# Patient Record
Sex: FEMALE | Race: WHITE | Employment: OTHER | URBAN - NONMETROPOLITAN AREA
[De-identification: names, ages, dates, MRNs, and addresses within clinical notes are randomized per-mention and may not be internally consistent; named-entity substitution may affect disease eponyms.]

---

## 2017-02-20 ENCOUNTER — TELEPHONE (OUTPATIENT)
Dept: NEUROSURGERY | Age: 57
End: 2017-02-20

## 2017-02-27 ENCOUNTER — OFFICE VISIT (OUTPATIENT)
Dept: NEUROLOGY | Age: 57
End: 2017-02-27
Payer: COMMERCIAL

## 2017-02-27 VITALS
DIASTOLIC BLOOD PRESSURE: 74 MMHG | HEIGHT: 66 IN | WEIGHT: 187 LBS | SYSTOLIC BLOOD PRESSURE: 112 MMHG | BODY MASS INDEX: 30.05 KG/M2

## 2017-02-27 DIAGNOSIS — G43.719 INTRACTABLE CHRONIC MIGRAINE WITHOUT AURA AND WITHOUT STATUS MIGRAINOSUS: Primary | ICD-10-CM

## 2017-02-27 DIAGNOSIS — Z88.9 HISTORY OF ADVERSE DRUG REACTION: ICD-10-CM

## 2017-02-27 DIAGNOSIS — G25.0 ESSENTIAL TREMOR: ICD-10-CM

## 2017-02-27 PROCEDURE — 99214 OFFICE O/P EST MOD 30 MIN: CPT | Performed by: PSYCHIATRY & NEUROLOGY

## 2017-02-27 RX ORDER — PANTOPRAZOLE SODIUM 40 MG/1
40 TABLET, DELAYED RELEASE ORAL DAILY
COMMUNITY
Start: 2017-02-06

## 2017-02-27 RX ORDER — LEVOFLOXACIN 500 MG/1
TABLET, FILM COATED ORAL
COMMUNITY
Start: 2017-01-03 | End: 2017-02-27 | Stop reason: ALTCHOICE

## 2017-02-27 RX ORDER — RIZATRIPTAN BENZOATE 10 MG/1
TABLET ORAL
COMMUNITY
Start: 2016-11-21 | End: 2017-02-27 | Stop reason: SDUPTHER

## 2017-03-02 RX ORDER — RIZATRIPTAN BENZOATE 10 MG/1
10 TABLET ORAL
Qty: 18 TABLET | Refills: 2 | Status: SHIPPED | OUTPATIENT
Start: 2017-03-02 | End: 2017-05-17 | Stop reason: SDUPTHER

## 2017-05-17 ENCOUNTER — TELEPHONE (OUTPATIENT)
Dept: NEUROLOGY | Age: 57
End: 2017-05-17

## 2017-05-17 RX ORDER — RIZATRIPTAN BENZOATE 10 MG/1
10 TABLET ORAL
Qty: 18 TABLET | Refills: 2 | Status: SHIPPED | OUTPATIENT
Start: 2017-05-17 | End: 2019-05-28

## 2017-06-30 ENCOUNTER — TELEPHONE (OUTPATIENT)
Dept: NEUROLOGY | Age: 57
End: 2017-06-30

## 2019-01-28 ENCOUNTER — TELEPHONE (OUTPATIENT)
Dept: NEUROLOGY | Age: 59
End: 2019-01-28

## 2019-02-27 ENCOUNTER — OFFICE VISIT (OUTPATIENT)
Dept: NEUROSURGERY | Age: 59
End: 2019-02-27
Payer: COMMERCIAL

## 2019-02-27 VITALS
HEIGHT: 66 IN | BODY MASS INDEX: 28.61 KG/M2 | OXYGEN SATURATION: 100 % | DIASTOLIC BLOOD PRESSURE: 92 MMHG | HEART RATE: 70 BPM | WEIGHT: 178 LBS | SYSTOLIC BLOOD PRESSURE: 133 MMHG

## 2019-02-27 DIAGNOSIS — R51.9 NONINTRACTABLE HEADACHE, UNSPECIFIED CHRONICITY PATTERN, UNSPECIFIED HEADACHE TYPE: Primary | ICD-10-CM

## 2019-02-27 PROCEDURE — 99204 OFFICE O/P NEW MOD 45 MIN: CPT | Performed by: PSYCHIATRY & NEUROLOGY

## 2019-02-27 RX ORDER — DIAZEPAM 5 MG/1
5 TABLET ORAL NIGHTLY
COMMUNITY
Start: 2019-02-26

## 2019-02-27 RX ORDER — PREDNISONE 1 MG/1
5 TABLET ORAL DAILY
COMMUNITY
Start: 2019-02-26 | End: 2019-05-28 | Stop reason: ALTCHOICE

## 2019-03-06 ENCOUNTER — TELEPHONE (OUTPATIENT)
Dept: NEUROLOGY | Age: 59
End: 2019-03-06

## 2019-03-07 ENCOUNTER — TELEPHONE (OUTPATIENT)
Dept: NEUROLOGY | Age: 59
End: 2019-03-07

## 2019-03-12 ENCOUNTER — TELEPHONE (OUTPATIENT)
Dept: NEUROLOGY | Age: 59
End: 2019-03-12

## 2019-05-28 ENCOUNTER — OFFICE VISIT (OUTPATIENT)
Dept: NEUROSURGERY | Age: 59
End: 2019-05-28
Payer: COMMERCIAL

## 2019-05-28 VITALS
OXYGEN SATURATION: 98 % | HEIGHT: 66 IN | DIASTOLIC BLOOD PRESSURE: 64 MMHG | HEART RATE: 64 BPM | SYSTOLIC BLOOD PRESSURE: 108 MMHG | BODY MASS INDEX: 27.48 KG/M2 | WEIGHT: 171 LBS

## 2019-05-28 DIAGNOSIS — R51.9 NONINTRACTABLE HEADACHE, UNSPECIFIED CHRONICITY PATTERN, UNSPECIFIED HEADACHE TYPE: Primary | ICD-10-CM

## 2019-05-28 PROCEDURE — 99214 OFFICE O/P EST MOD 30 MIN: CPT | Performed by: PSYCHIATRY & NEUROLOGY

## 2019-05-28 NOTE — PROGRESS NOTES
[facility] Neurology Office Note      Patient:   Luanne Dykes  MR#:    [id number]  Account Number:                         YOB: 1960  Date of Evaluation:  5/28/2019  Time of Note:                          12:02 PM  Primary/Referring Physician:  Gato Cho MD  Consulting Physician:  Adrain Severance, DO    FOLLOW UP VISIT    Chief Complaint   Patient presents with    Migraine     3 Month follow up headaches are better     Results     MRI result       HISTORY OF PRESENT ILLNESS    Luanne Dykes is a 66y.o. year old female here for headaches. Doing better on Emgality. MRI with nonspecific white matter changes and atrophy. Present for quite sometime. Pain is typically posterior, will last hours to days. Some photophobia. She does note some visual changes with the headaches, though no consistent aura noted. Some nausea noted. Relieved with rest.  Though headaches occur typically at night. Still \rResponds well to Maxalt. Current headache frequency is much improved. Similar duration and character. She has tried Topamax and did not help.       Past Medical History:   Diagnosis Date    Depression     seasonal    GERD (gastroesophageal reflux disease)     Kidney stone        Past Surgical History:   Procedure Laterality Date    KIDNEY STONE SURGERY      TONSILLECTOMY         Family History   Problem Relation Age of Onset    Depression Mother     Alzheimer's Disease Mother        Social History     Socioeconomic History    Marital status: Single     Spouse name: Not on file    Number of children: Not on file    Years of education: Not on file    Highest education level: Not on file   Occupational History    Not on file   Social Needs    Financial resource strain: Not on file    Food insecurity:     Worry: Not on file     Inability: Not on file    Transportation needs:     Medical: Not on file     Non-medical: Not on file   Tobacco Use    Smoking status: Never Smoker    Smokeless tobacco: Never Used   Substance and Sexual Activity    Alcohol use: No    Drug use: Never    Sexual activity: Not Currently   Lifestyle    Physical activity:     Days per week: Not on file     Minutes per session: Not on file    Stress: Not on file   Relationships    Social connections:     Talks on phone: Not on file     Gets together: Not on file     Attends Uatsdin service: Not on file     Active member of club or organization: Not on file     Attends meetings of clubs or organizations: Not on file     Relationship status: Not on file    Intimate partner violence:     Fear of current or ex partner: Not on file     Emotionally abused: Not on file     Physically abused: Not on file     Forced sexual activity: Not on file   Other Topics Concern    Not on file   Social History Narrative    Not on file       Current Outpatient Medications   Medication Sig Dispense Refill    diazepam (VALIUM) 5 MG tablet Take 5 mg by mouth nightly. Low Bruner Galcanezumab-gnlm (EMGALITY) 120 MG/ML SOAJ Inject 120 mg into the skin every 30 days 1 pen 11    rizatriptan (MAXALT) 10 MG tablet Take 1 tablet by mouth once as needed for Migraine May repeat in 2 hours if needed 18 tablet 2    pantoprazole (PROTONIX) 40 MG tablet Take 40 mg by mouth daily        No current facility-administered medications for this visit.         Allergies   Allergen Reactions    Pcn [Penicillins] Rash     rash    Sulfa Antibiotics Rash     Rash           REVIEW OF SYSTEMS    Constitutional: []Fever []Sweat []Chills [] Recent Injury [x] Denies all unless marked  HEENT:[]Headache  [] Head Injury/Hearing Loss  [] Sore Throat  [] Ear Ache/Dizziness  [x] Denies all unless marked  Spine:  [] Neck pain  [] Back pain  [] Sciaticia  [x] Denies all unless marked  Cardiovascular:[]Heart Disease []Chest Pain [] Palpitations  [x] Denies all unless marked  Pulmonary: []Shortness of Breath []Cough   [x] Denies all unless marke  Gastrointestinal: []Nausea []Vomiting  []Abdominal Pain  []Constipation  []Diarrhea  []Dark Bloody Stools  [x] Denies all unless marked  Psychiatric/Behavioral:[] Depression [] Anxiety [x] Denies all unless marked  Genitourinary:   [] Frequency  [] Urgency  [] Incontinence [] Pain with Urination  [x] Denies all unless marked  Extremities: []Pain  []Swelling  [x] Denies all unless marked  Musculoskeletal: [] Muscle Pain  [] Joint Pain  [] Arthritis [] Muscle Cramps [] Muscle Twitches  [x] Denies all unless marked  Sleep: [] Insomnia [] Snoring [] Restless Legs [] Sleep Apnea  [] Daytime Sleepiness  [x] Denies all unless marked  Skin:[] Rash [] Skin Discoloration [x] Denies all unless marked   Neurological: []Visual Disturbance/Memory Loss [] Loss of Balance [] Slurred Speech/Weakness [] Seizures  [] Vertigo/Dizziness [x] Denies all unless marked     I have reviewed the above ROS with the patient and agree with the ROS as documented above. PHYSICAL EXAM    Constitutional -   /64   Pulse 64   Ht 5' 6\" (1.676 m)   Wt 171 lb (77.6 kg)   SpO2 98%   BMI 27.60 kg/m²   General appearance: No acute distress   EYES -   Conjunctiva normal  Pupillary exam as below, see CN exam in the neurologic exam  ENT-    No scars, masses, or lesions over external nose or ears  Hearing normal bilaterally to finger rub  Cardiovascular -   RRR  No clubbing, cyanosis, or edema   Pulmonary-   Good expansion, normal effort without use of accessory muscles  CTA  Musculoskeletal -   No significant wasting of muscles noted  Gait as below, see gait exam in the neurologic exam  Muscle strength, tone, stability as below. No bony deformities  Skin -   Warm, dry, and intact to inspection and palpation.     No rash, erythema, or pallor  Psychiatric -   Mood, affect, and behavior appear normal    Memory as below see mental status examination in the neurologic exam    NEUROLOGICAL EXAM    Mental status   [x]Awake, alert, oriented   [x]Affect attention and concentration appear appropriate  [x]Recent and remote memory appears unremarkable  [x]Speech normal without dysarthria or aphasia, comprehension and repetition intact. COMMENTS:    Cranial Nerves [x]No VF deficit to confrontation,  no papilledema on fundoscopic exam.  [x]PERRLA, EOMI, no nystagmus, conjugate eye movements, no ptosis  [x]Face symmetric  [x]Facial sensation intact  [x]Tongue midline no atrophy or fasciculations present  [x]Palate midline, hearing to finger rub normal bilaterally  [x]Shoulder shrug and SCM testing normal bilaterally  COMMENTS:   Motor   [x]5/5 strength x 4 extremities  [x]Normal bulk and tone  []No tremor present  [x]No rigidity or bradykinesia noted  COMMENTS: Right hand tremor, mild, postural   Sensory  [x]Sensation intact to light touch, pin prick, vibration, and proprioception BLE  []Sensation intact to light touch, pin prick, vibration, and proprioception BUE  COMMENTS:   Coordination [x]FTN normal bilaterally   []HTS normal bilaterally  []WILI normal bilaterally. COMMENTS:   Reflexes  [x]Symmetric and non-pathological  [x]Toes down going bilaterally  [x]No clonus present  COMMENTS:   Gait                  [x]Normal steady gait    []Ataxic    []Spastic     []Magnetic     []Shuffling  COMMENTS:       LABS RECORD AND IMAGING REVIEW (As below and per HPI)    Records reviewed. ASSESSMENT:    Pernell Moseley is a 66y.o. year old female here for headaches. Suspect migraine given history. MRI with nonspecific white matter changes and atrophy. Mild tremor noted as well, unchanged. PLAN:  1. Continue Emgality for prevention. Side effects discussed. 2.  Continue maxalt prn  3. Follow tremor, question very subtle parkinsonism, follow clinically. 4.  Non-specific MRI changes, MS felt unlikely, NPH also felt unlikely, will follow clinically, repeat imaging or further workup if needed.      Pavel Morales DO  Board Certified Neurology

## 2021-07-12 ENCOUNTER — NURSE TRIAGE (OUTPATIENT)
Dept: OTHER | Facility: CLINIC | Age: 61
End: 2021-07-12

## 2021-07-12 ENCOUNTER — HOSPITAL ENCOUNTER (EMERGENCY)
Age: 61
Discharge: HOME | End: 2021-07-12
Attending: EMERGENCY MEDICINE
Payer: COMMERCIAL

## 2021-07-12 ENCOUNTER — APPOINTMENT (OUTPATIENT)
Dept: MRI IMAGING | Age: 61
End: 2021-07-12
Payer: COMMERCIAL

## 2021-07-12 ENCOUNTER — APPOINTMENT (OUTPATIENT)
Dept: CT IMAGING | Age: 61
End: 2021-07-12
Payer: COMMERCIAL

## 2021-07-12 ENCOUNTER — APPOINTMENT (OUTPATIENT)
Dept: GENERAL RADIOLOGY | Age: 61
End: 2021-07-12
Payer: COMMERCIAL

## 2021-07-12 VITALS
TEMPERATURE: 97.8 F | HEART RATE: 98 BPM | OXYGEN SATURATION: 99 % | DIASTOLIC BLOOD PRESSURE: 69 MMHG | WEIGHT: 185 LBS | RESPIRATION RATE: 18 BRPM | BODY MASS INDEX: 29.86 KG/M2 | SYSTOLIC BLOOD PRESSURE: 135 MMHG

## 2021-07-12 DIAGNOSIS — H53.2 DIPLOPIA: Primary | ICD-10-CM

## 2021-07-12 DIAGNOSIS — R42 DIZZINESS: ICD-10-CM

## 2021-07-12 LAB
ALBUMIN SERPL-MCNC: 4.8 G/DL (ref 3.5–5.2)
ALP BLD-CCNC: 89 U/L (ref 35–104)
ALT SERPL-CCNC: 19 U/L (ref 5–33)
ANION GAP SERPL CALCULATED.3IONS-SCNC: 11 MMOL/L (ref 7–19)
APTT: 27 SEC (ref 26–36.2)
AST SERPL-CCNC: 20 U/L (ref 5–32)
BASOPHILS ABSOLUTE: 0 K/UL (ref 0–0.2)
BASOPHILS RELATIVE PERCENT: 0.5 % (ref 0–1)
BILIRUB SERPL-MCNC: <0.2 MG/DL (ref 0.2–1.2)
BILIRUBIN URINE: NEGATIVE
BLOOD, URINE: NEGATIVE
BUN BLDV-MCNC: 13 MG/DL (ref 6–20)
C-REACTIVE PROTEIN: 0.4 MG/DL (ref 0–0.5)
CALCIUM SERPL-MCNC: 9.9 MG/DL (ref 8.6–10)
CHLORIDE BLD-SCNC: 101 MMOL/L (ref 98–111)
CLARITY: CLEAR
CO2: 26 MMOL/L (ref 22–29)
COLOR: YELLOW
CREAT SERPL-MCNC: 0.8 MG/DL (ref 0.5–0.9)
EOSINOPHILS ABSOLUTE: 0.1 K/UL (ref 0–0.6)
EOSINOPHILS RELATIVE PERCENT: 2.6 % (ref 0–5)
GFR AFRICAN AMERICAN: >59
GFR NON-AFRICAN AMERICAN: >60
GLUCOSE BLD-MCNC: 105 MG/DL (ref 74–109)
GLUCOSE URINE: NEGATIVE MG/DL
HCT VFR BLD CALC: 43.4 % (ref 37–47)
HEMOGLOBIN: 14.3 G/DL (ref 12–16)
IMMATURE GRANULOCYTES #: 0 K/UL
INR BLD: 0.93 (ref 0.88–1.18)
KETONES, URINE: NEGATIVE MG/DL
LEUKOCYTE ESTERASE, URINE: NEGATIVE
LYMPHOCYTES ABSOLUTE: 1.8 K/UL (ref 1.1–4.5)
LYMPHOCYTES RELATIVE PERCENT: 33.6 % (ref 20–40)
MCH RBC QN AUTO: 28.5 PG (ref 27–31)
MCHC RBC AUTO-ENTMCNC: 32.9 G/DL (ref 33–37)
MCV RBC AUTO: 86.6 FL (ref 81–99)
MONOCYTES ABSOLUTE: 0.5 K/UL (ref 0–0.9)
MONOCYTES RELATIVE PERCENT: 9 % (ref 0–10)
NEUTROPHILS ABSOLUTE: 3 K/UL (ref 1.5–7.5)
NEUTROPHILS RELATIVE PERCENT: 54.1 % (ref 50–65)
NITRITE, URINE: NEGATIVE
PDW BLD-RTO: 13.6 % (ref 11.5–14.5)
PH UA: 5.5 (ref 5–8)
PLATELET # BLD: 308 K/UL (ref 130–400)
PMV BLD AUTO: 9.1 FL (ref 9.4–12.3)
POTASSIUM REFLEX MAGNESIUM: 4.1 MMOL/L (ref 3.5–5)
PRO-BNP: 29 PG/ML (ref 0–900)
PROTEIN UA: NEGATIVE MG/DL
PROTHROMBIN TIME: 12.7 SEC (ref 12–14.6)
RBC # BLD: 5.01 M/UL (ref 4.2–5.4)
SEDIMENTATION RATE, ERYTHROCYTE: 1 MM/HR (ref 0–25)
SODIUM BLD-SCNC: 138 MMOL/L (ref 136–145)
SPECIFIC GRAVITY UA: 1.01 (ref 1–1.03)
TOTAL PROTEIN: 7.6 G/DL (ref 6.6–8.7)
TROPONIN: <0.01 NG/ML (ref 0–0.03)
UROBILINOGEN, URINE: 0.2 E.U./DL
WBC # BLD: 5.5 K/UL (ref 4.8–10.8)

## 2021-07-12 PROCEDURE — 85610 PROTHROMBIN TIME: CPT

## 2021-07-12 PROCEDURE — 83880 ASSAY OF NATRIURETIC PEPTIDE: CPT

## 2021-07-12 PROCEDURE — 71046 X-RAY EXAM CHEST 2 VIEWS: CPT

## 2021-07-12 PROCEDURE — 99283 EMERGENCY DEPT VISIT LOW MDM: CPT

## 2021-07-12 PROCEDURE — 85652 RBC SED RATE AUTOMATED: CPT

## 2021-07-12 PROCEDURE — 70496 CT ANGIOGRAPHY HEAD: CPT

## 2021-07-12 PROCEDURE — 85025 COMPLETE CBC W/AUTO DIFF WBC: CPT

## 2021-07-12 PROCEDURE — 36415 COLL VENOUS BLD VENIPUNCTURE: CPT

## 2021-07-12 PROCEDURE — 70450 CT HEAD/BRAIN W/O DYE: CPT

## 2021-07-12 PROCEDURE — 6360000004 HC RX CONTRAST MEDICATION: Performed by: EMERGENCY MEDICINE

## 2021-07-12 PROCEDURE — 85730 THROMBOPLASTIN TIME PARTIAL: CPT

## 2021-07-12 PROCEDURE — 81003 URINALYSIS AUTO W/O SCOPE: CPT

## 2021-07-12 PROCEDURE — 86140 C-REACTIVE PROTEIN: CPT

## 2021-07-12 PROCEDURE — 93005 ELECTROCARDIOGRAM TRACING: CPT | Performed by: EMERGENCY MEDICINE

## 2021-07-12 PROCEDURE — 70498 CT ANGIOGRAPHY NECK: CPT

## 2021-07-12 PROCEDURE — 70551 MRI BRAIN STEM W/O DYE: CPT

## 2021-07-12 PROCEDURE — 84484 ASSAY OF TROPONIN QUANT: CPT

## 2021-07-12 PROCEDURE — 80053 COMPREHEN METABOLIC PANEL: CPT

## 2021-07-12 RX ORDER — CHOLECALCIFEROL (VITAMIN D3) 1250 MCG
CAPSULE ORAL
COMMUNITY

## 2021-07-12 RX ADMIN — IOPAMIDOL 90 ML: 755 INJECTION, SOLUTION INTRAVENOUS at 14:27

## 2021-07-12 ASSESSMENT — ENCOUNTER SYMPTOMS
SHORTNESS OF BREATH: 0
VOMITING: 0
COUGH: 0
DIARRHEA: 0
EYE PAIN: 0
VOICE CHANGE: 0
RHINORRHEA: 0
ABDOMINAL PAIN: 0
EYE REDNESS: 0

## 2021-07-12 ASSESSMENT — PAIN DESCRIPTION - PAIN TYPE: TYPE: ACUTE PAIN

## 2021-07-12 ASSESSMENT — PAIN DESCRIPTION - LOCATION: LOCATION: HEAD

## 2021-07-12 ASSESSMENT — PAIN DESCRIPTION - DESCRIPTORS: DESCRIPTORS: ACHING

## 2021-07-12 ASSESSMENT — PAIN SCALES - GENERAL: PAINLEVEL_OUTOF10: 2

## 2021-07-12 NOTE — ED TRIAGE NOTES
Pt here with blurred and double vision for the past 3 weeks pt states it came on all of a sudden. Pt has been seen by PCP and eye doctor with no answers.   PCP has adjusted bp meds and eye doctor has seen no issues

## 2021-07-12 NOTE — ED PROVIDER NOTES
[facility] EMERGENCY DEPT  EMERGENCY DEPARTMENT ENCOUNTER      Pt Name: Vinny Castillo  MRN: [id number]  [place] 1960  Date of evaluation: 7/12/2021  Provider: Sonu Santo MD    CHIEF COMPLAINT       Chief Complaint   Patient presents with   [facility]     for 3 weeks blurred and double    Headache         HISTORY OF PRESENT ILLNESS   (Location/Symptom, Timing/Onset,Context/Setting, Quality, Duration, Modifying Factors, Severity)  Note limiting factors. Vinny Castillo is a 64 y.o. female who presents to the emergency department for evaluation of blurred and double vision that has been present over the last 3 weeks. States this started suddenly and has been associated with a left-sided headache as well. States vision in her right eye seem to be affected much more than her left. Has constant blurred vision which she describes as images being stacked on top of 1 another. Does not seem to be with any certain gaze, but is more constant. States she has seen an ophthalmologist twice and had work-up done that showed no primary ocular etiology. Has seen her primary care doctor who tried 2 different antihypertensives which made her feel worse. Try to get into neurology but was advised to come here for evaluation. Denies any weakness, numbness, or tingling. Does feel dizzy and lightheaded which is constant. No specific spinning or vertiginous sensation reported. Dizziness is worsened by eye movements or ambulation. Does have some balance difficulty as well. Patient has no significant chronic medical problems such as diabetes, high blood pressure, heart problems. No history of stroke. The history is provided by the patient and a relative. NursingNotes were reviewed. REVIEW OF SYSTEMS    (2-9 systems for level 4, 10 or more for level 5)     Review of Systems   Constitutional: Positive for fatigue. Negative for fever. HENT: Negative for congestion, rhinorrhea and voice change.     Eyes: Smoking status: Never Smoker    Smokeless tobacco: Never Used   Vaping Use    Vaping Use: Never used   Substance and Sexual Activity    Alcohol use: No    Drug use: Never    Sexual activity: Not Currently   Other Topics Concern    None   Social History Narrative    None     Social Determinants of Health     Financial Resource Strain:     Difficulty of Paying Living Expenses:    Food Insecurity:     Worried About Running Out of Food in the Last Year:     Ran Out of Food in the Last Year:    Transportation Needs:     Lack of Transportation (Medical):  Lack of Transportation (Non-Medical):    Physical Activity:     Days of Exercise per Week:     Minutes of Exercise per Session:    Stress:     Feeling of Stress :    Social Connections:     Frequency of Communication with Friends and Family:     Frequency of Social Gatherings with Friends and Family:     Attends Sabianism Services:     Active Member of Clubs or Organizations:     Attends Club or Organization Meetings:     Marital Status:    Intimate Partner Violence:     Fear of Current or Ex-Partner:     Emotionally Abused:     Physically Abused:     Sexually Abused:        SCREENINGS             PHYSICAL EXAM    (up to 7 for level 4, 8 or more for level 5)     ED Triage Vitals [07/12/21 1155]   BP Temp Temp src Pulse Resp SpO2 Height Weight   (!) 156/90 97.8 °F (36.6 °C) -- 97 18 95 % -- 185 lb (83.9 kg)       Physical Exam  Vitals and nursing note reviewed. Constitutional:       General: She is not in acute distress. Appearance: She is well-developed. She is not toxic-appearing or diaphoretic. HENT:      Head: Normocephalic and atraumatic. Eyes:      General: No scleral icterus. Right eye: No discharge. Left eye: No discharge. Pupils: Pupils are equal, round, and reactive to light. Cardiovascular:      Rate and Rhythm: Normal rate and regular rhythm. Pulses: Normal pulses.       Heart sounds: Normal heart sounds. Pulmonary:      Effort: Pulmonary effort is normal. No respiratory distress. Breath sounds: Normal breath sounds. No stridor. No wheezing or rhonchi. Abdominal:      General: There is no distension. Musculoskeletal:         General: No deformity. Normal range of motion. Cervical back: Normal range of motion. Right lower leg: No edema. Left lower leg: No edema. Skin:     General: Skin is warm and dry. Neurological:      Mental Status: She is alert and oriented to person, place, and time. GCS: GCS eye subscore is 4. GCS verbal subscore is 5. GCS motor subscore is 6. Cranial Nerves: Cranial nerves are intact. No cranial nerve deficit. Sensory: Sensation is intact. Motor: Motor function is intact. No weakness or abnormal muscle tone. Coordination: Finger-Nose-Finger Test abnormal.      Comments: Nystagmus with leftward gaze. Slightly abnormal coordination/cerebellar testing in the left upper extremity. Psychiatric:         Behavior: Behavior normal.         Thought Content: Thought content normal.         Judgment: Judgment normal.         DIAGNOSTIC RESULTS     EKG: All EKG's are interpreted by the Emergency Department Physician who either signs or Co-signs this chart in the absence of a cardiologist.        RADIOLOGY:   Non-plain film images such as CT, Ultrasound and MRI are read by the radiologist. Plainradiographic images are visualized and preliminarily interpreted by the emergency physician with the below findings:      Interpretation per the Radiologist below, if available at the time of this note:    MRI BRAIN WO CONTRAST   Final Result   1. No acute intracranial findings. 2. Minimal nonspecific FLAIR signal hyperintensities in the white   matter, most often seen as sequela of chronic microvascular ischemia. Signed by Dr Amando Rebollar   Final Result   1. No flow-limiting stenosis or arterial occlusion in the neck.  In particular, there is no flow-limiting stenosis at the ICA origins   according to the NASCET calculation, with essentially no atheromatous   plaque identified at the ICA origins. 2. No intracranial flow-limiting stenosis or large vessel occlusion. Signed by Dr Ghassan Mcdonald   Final Result   1. No flow-limiting stenosis or arterial occlusion in the neck. In   particular, there is no flow-limiting stenosis at the ICA origins   according to the NASCET calculation, with essentially no atheromatous   plaque identified at the ICA origins. 2. No intracranial flow-limiting stenosis or large vessel occlusion. Signed by Dr Taylor Mohan Contrast   Final Result   1. No acute intracranial process. Signed by Dr Heather Nam      XR CHEST (2 VW)   Final Result   1. No radiographic evidence of acute cardiopulmonary process. Signed by Dr Babs Blanco            ED BEDSIDE ULTRASOUND:   Performed by ED Physician - none    LABS:  Labs Reviewed   CBC WITH AUTO DIFFERENTIAL - Abnormal; Notable for the following components:       Result Value    MCHC 32.9 (*)     MPV 9.1 (*)     All other components within normal limits   COMPREHENSIVE METABOLIC PANEL W/ REFLEX TO MG FOR LOW K   APTT   PROTIME-INR   TROPONIN   BRAIN NATRIURETIC PEPTIDE   URINE RT REFLEX TO CULTURE   SEDIMENTATION RATE   C-REACTIVE PROTEIN       All other labs were within normal range or not returned as of this dictation.     Medications   iopamidol (ISOVUE-370) 76 % injection 90 mL (90 mLs Intravenous Given 7/12/21 1427)       EMERGENCY DEPARTMENT COURSE and DIFFERENTIALDIAGNOSIS/MDM:   Vitals:    Vitals:    07/12/21 1300 07/12/21 1332 07/12/21 1400 07/12/21 1500   BP: (!) 149/95 137/71 131/77 135/69   Pulse: 96   98   Resp: 18   18   Temp:       SpO2: 95% 100% 99% 99%   Weight:           MDM  Number of Diagnoses or Management Options  Diplopia: new and requires workup  Dizziness: new and requires workup     Amount and/or Complexity of Data Reviewed  Clinical lab tests: ordered and reviewed  Tests in the radiology section of CPT®: ordered and reviewed  Tests in the medicine section of CPT®: reviewed and ordered  Obtain history from someone other than the patient: yes  Review and summarize past medical records: yes  Discuss the patient with other providers: yes  Independent visualization of images, tracings, or specimens: yes    Risk of Complications, Morbidity, and/or Mortality  Presenting problems: high  Diagnostic procedures: moderate  Management options: moderate    Patient Progress  Patient progress: stable      ED Course as of Jul 12 2119   Mon Jul 12, 2021   1728 Sed Rate: 1 [EDDIE]   1800 On neurologic exam, evaluation is normal other than some mild leftward nystagmus and slight questionable left upper extremity difficulty with finger-nose-finger. CT and CTA of the head and neck are unremarkable. Laboratory evaluation also unremarkable. Sed rate is well within normal limits. Discussed case with neurology on-call who recommends brain MRI to rule out posterior circulation abnormality. MRI negative for any acute findings such as mass, infarct, hemorrhage, or other acute findings to explain symptoms. Does show some chronic appearing white matter changes likely consistent with small vessel ischemic disease. [EDDIE]      ED Course User Index  [EDDIE] Milena Choudhury MD     Evaluation and work-up here revealed no signs of emergent or life-threatening pathology that would necessitate admission for further work-up or management at this time. Patient is felt to be stable for discharge home with return precautions for worsening of the condition or development of new concerning symptoms. Patient was encouraged to follow-up with their primary care doctor in the appropriate timeframe. Necessary prescriptions and information have been provided for treatment at home.   Patient voices understanding and agreement with the plan.      CONSULTS:  None    PROCEDURES:  Unless otherwise notedbelow, none     Procedures      FINAL IMPRESSION     1. Diplopia    2.  Dizziness          DISPOSITION/PLAN   DISPOSITION Decision To Discharge 07/12/2021 06:09:21 PM      PATIENT REFERRED TO:  [facility] EMERGENCY DEPT  [facility]  [phone]    If symptoms worsen    Janice Medley MD  [address] [facility] [phone]    Go to   at [address] [place] [address] tomorrow (7/13)      DISCHARGE MEDICATIONS:  Discharge Medication List as of 7/12/2021  6:52 PM             (Please note that portions of this note were completed with a voice recognition program.  Efforts were made to edit the dictations butoccasionally words are mis-transcribed.)    Sonu Santo MD (electronically signed)  AttendingEmerNorthwest Health Physicians' Specialty Hospitalcy Physician         Sonu Berger MD  07/12/21 7079

## 2021-07-12 NOTE — TELEPHONE ENCOUNTER
Received call from Afsaneh Galvan at [facility] with Red Flag Complaint. Brief description of triage: patient is c/o blurred vision is right eye, dizziness, HA, brain fog and she has been seen her eye doctor and her PCP for this. She is calling to now schedule an appointment with Neurology. Symptoms are worsening since being seen by PCP. Triage indicates for patient to go to ED now    Care advice provided, patient verbalizes understanding; denies any other questions or concerns; instructed to call back for any new or worsening symptoms. Attention Provider: Thank you for allowing me to participate in the care of your patient. The patient was connected to triage in response to information provided to the [facility]. Please do not respond through this encounter as the response is not directed to a shared pool. Reason for Disposition   Patient sounds very sick or weak to the triager    Answer Assessment - Initial Assessment Questions  1. DESCRIPTION: Tello Lucas is the vision loss like? Describe it for me. \" (e.g., complete vision loss, blurred vision, double vision, floaters, etc.)      Blurred vision    2. LOCATION: \"One or both eyes? \" If one, ask: \"Which eye? \"      Both eyes    3. SEVERITY: \"Can you see anything? \" If so, ask: \"What can you see? \" (e.g., fine print)      Just blurred    4. ONSET: \"When did this begin? \" \"Did it start suddenly or has this been gradual?\"      3 weeks ago, sudden    5. PATTERN: \"Does this come and go, or has it been constant since it started? \"      Constant    6. PAIN: \"Is there any pain in your eye(s)? \"  (Scale 1-10; or mild, moderate, severe)      Denies    7. CONTACTS-GLASSES: \"Do you wear contacts or glasses? \"      \"cheaters\"    8. CAUSE: \"What do you think is causing this visual problem? \"      Unsure    9. OTHER SYMPTOMS: \"Do you have any other symptoms? \" (e.g., confusion, headache, arm or leg weakness, speech problems)      Dizzy, fatigue, HA, brain fog    10.  PREGNANCY: \"Is there any chance you are pregnant? \" \"When was your last menstrual period? \"        NA    Protocols used: VISION LOSS OR CHANGE-ADULT-OH

## 2021-07-13 ENCOUNTER — OFFICE VISIT (OUTPATIENT)
Dept: NEUROLOGY | Age: 61
End: 2021-07-13
Payer: COMMERCIAL

## 2021-07-13 VITALS
DIASTOLIC BLOOD PRESSURE: 85 MMHG | HEIGHT: 66 IN | WEIGHT: 185 LBS | HEART RATE: 65 BPM | BODY MASS INDEX: 29.73 KG/M2 | SYSTOLIC BLOOD PRESSURE: 127 MMHG

## 2021-07-13 DIAGNOSIS — H53.2 DOUBLE VISION: ICD-10-CM

## 2021-07-13 DIAGNOSIS — H53.9 VISUAL CHANGES: ICD-10-CM

## 2021-07-13 DIAGNOSIS — R53.83 OTHER FATIGUE: ICD-10-CM

## 2021-07-13 DIAGNOSIS — H53.9 VISUAL CHANGES: Primary | ICD-10-CM

## 2021-07-13 LAB
EKG P AXIS: 27 DEGREES
EKG P-R INTERVAL: 188 MS
EKG Q-T INTERVAL: 408 MS
EKG QRS DURATION: 90 MS
EKG QTC CALCULATION (BAZETT): 411 MS
EKG T AXIS: 6 DEGREES
VITAMIN B-12: 641 PG/ML (ref 211–946)

## 2021-07-13 PROCEDURE — 93010 ELECTROCARDIOGRAM REPORT: CPT | Performed by: INTERNAL MEDICINE

## 2021-07-13 PROCEDURE — 1111F DSCHRG MED/CURRENT MED MERGE: CPT | Performed by: PSYCHIATRY & NEUROLOGY

## 2021-07-13 PROCEDURE — 99204 OFFICE O/P NEW MOD 45 MIN: CPT | Performed by: PSYCHIATRY & NEUROLOGY

## 2021-07-13 RX ORDER — DOXYCYCLINE HYCLATE 100 MG
100 TABLET ORAL 2 TIMES DAILY
Qty: 14 TABLET | Refills: 0 | Status: SHIPPED | OUTPATIENT
Start: 2021-07-13 | End: 2021-07-20

## 2021-07-13 NOTE — PROGRESS NOTES
Review of Systems    Constitutional - No fever or chills. No diaphoresis or significant fatigue. HENT -  No tinnitus or significant hearing loss. Eyes - yes sudden vision change or eye pain  Respiratory - no significant shortness of breath or cough  Cardiovascular - no chest pain No palpitations or significant leg swelling  Gastrointestinal - no abdominal swelling or pain. Genitourinary - No difficulty urinating, dysuria  Musculoskeletal - no back pain or myalgia. Skin - no color change or rash  Neurologic - No seizures. No lateralizing weakness. Hematologic - no easy bruising or excessive bleeding. Psychiatric - no severe anxiety or nervousness. All other review of systems are negative.

## 2021-07-13 NOTE — PROGRESS NOTES
Chief Complaint   Patient presents with    Follow-Up from Hospital     maxine Farley is a 64y.o. year old female who is seen for evaluation of blurred vision in both eyes, double vision in the right eye, and some difficulty thinking. The patient indicates that about 2 weeks ago she was cutting some limbs. The next morning she noted some bumps over the right eyelid. The morning after that her right eyelid was swelling till it was shut. This resolved but she had double vision in the right eye with bilateral blurred vision. She has some difficulty thinking. The example she provided was that she dressed before drying her hair which is not normal for her. She indicates her eye exam was unremarkable. She went to the ER and an MRI of the brain, CTA of the head and neck were unremarkable. She had a sed rate of 1.     Active Ambulatory Problems     Diagnosis Date Noted    No Active Ambulatory Problems     Resolved Ambulatory Problems     Diagnosis Date Noted    No Resolved Ambulatory Problems     Past Medical History:   Diagnosis Date    Depression     GERD (gastroesophageal reflux disease)     Kidney stone        Past Surgical History:   Procedure Laterality Date    KIDNEY STONE SURGERY      TONSILLECTOMY         Family History   Problem Relation Age of Onset    Depression Mother     Alzheimer's Disease Mother        Allergies   Allergen Reactions    Pcn [Penicillins] Rash     rash    Sulfa Antibiotics Rash     Rash         Social History     Socioeconomic History    Marital status: Single     Spouse name: Not on file    Number of children: Not on file    Years of education: Not on file    Highest education level: Not on file   Occupational History    Not on file   Tobacco Use    Smoking status: Never Smoker    Smokeless tobacco: Never Used   Vaping Use    Vaping Use: Never used   Substance and Sexual Activity    Alcohol use: No    Drug use: Never    Sexual activity: Not diazepam (VALIUM) 5 MG tablet Take 5 mg by mouth nightly. Osmar Florence pantoprazole (PROTONIX) 40 MG tablet Take 40 mg by mouth daily       rizatriptan (MAXALT) 10 MG tablet Take 1 tablet by mouth once as needed for Migraine May repeat in 2 hours if needed 18 tablet 2     No current facility-administered medications for this visit. /85   Pulse 65   Ht 5' 6\" (1.676 m)   Wt 185 lb (83.9 kg)   BMI 29.86 kg/m²     Constitutional - well developed, well nourished. Eyes - conjunctiva normal.  Pupils not tested  Ear, nose, throat -hearing intact to finger rub No scars, masses, or lesions over external nose or ears, no atrophy of tongue  Neck-symmetric, no masses noted, no jugular vein distension  Respiration- chest wall appears symmetric, good expansion,   normal effort without use of accessory muscles  Musculoskeletal - no significant wasting of muscles noted, no bony deformities  Extremities-no clubbing, cyanosis or edema  Skin - warm, dry, and intact. No rash, erythema, or pallor.   Psychiatric - mood, affect, and behavior appear normal.      Neurological exam  Awake, alert, fluent oriented x 3 appropriate affect  Attention and concentration appear appropriate  Recent and remote memory appears unremarkable  Speech normal without dysarthria  No clear issues with language of fund of knowledge    Cranial Nerve Exam   CN II- Visual fields grossly unremarkable  CN III, IV,VI-EOMI, No nystagmus, conjugate eye movements, no ptosis  CN V-sensation intact to LT over face  CN VII-no facial assymetry  CN VIII-Hearing intact to finger rub  CN IX and X- Palate not tested  CN XI-not test shoulder shrug  CN XII-Tongue midline with no fasciculations or fibrillations    Motor Exam  V/V throughout upper and lower extremities bilaterally, no cogwheeling, normal tone    Sensory Exam  Sensation intact to light touch and temperature upper and lower extremities bilaterally    Reflexes   2+ biceps bilaterally  2+ brachioradialis  2+patella  2+ ankle jerks  No clonus ankles  No Selby's sign bilateral hands    Tremors- no tremors in hands or head noted    Gait  Normal base and speed  No ataxia    Coordination  Finger to nose-unremarkable    Lab Results   Component Value Date    [id number] 641 07/13/2021     Lab Results   Component Value Date    WBC 5.5 07/12/2021    HGB 14.3 07/12/2021    HCT 43.4 07/12/2021    MCV 86.6 07/12/2021     07/12/2021     Lab Results   Component Value Date     07/12/2021    K 4.1 07/12/2021     07/12/2021    CO2 26 07/12/2021    BUN 13 07/12/2021    CREATININE 0.8 07/12/2021    GLUCOSE 105 07/12/2021    CALCIUM 9.9 07/12/2021    PROT 7.6 07/12/2021    LABALBU 4.8 07/12/2021    BILITOT <0.2 07/12/2021    ALKPHOS 89 07/12/2021    AST 20 07/12/2021    ALT 19 07/12/2021    LABGLOM >60 07/12/2021    GFRAA >59 07/12/2021           Assessment    ICD-10-CM    1. Visual changes  H53.9 Heavy Metal Blood Panel     Lupus (LE) panel w/ reflex     LYME (B.BURGDORFERI) PCR     Lyme Disease Acute Reflexive Panel     Vitamin B12     Rickettsia Rickettsii ([place] Spotted Fever -RMSF) Antibodies IgG & IgM by IFA     Ehrlichia Antibody Panel     AK DISCHARGE MEDS RECONCILED W/ CURRENT OUTPATIENT MED LIST     CANCELED: B. Burgdorferi Antibodies by WB     CANCELED: YADI 2 - Anti SSA & Anti SSB   2. Double vision  H53.2 Heavy Metal Blood Panel     Lupus (LE) panel w/ reflex     LYME (B.BURGDORFERI) PCR     Lyme Disease Acute Reflexive Panel     Vitamin B12     Rickettsia Rickettsii ([place] Spotted Fever -RMSF) Antibodies IgG & IgM by IFA     Ehrlichia Antibody Panel     AK DISCHARGE MEDS RECONCILED W/ CURRENT OUTPATIENT MED LIST     CANCELED: B. Burgdorferi Antibodies by WB     CANCELED: YADI 2 - Anti SSA & Anti SSB   3.  Other fatigue  R53.83 Heavy Metal Blood Panel     Lupus (LE) panel w/ reflex     LYME (B.BURGDORFERI) PCR     Lyme Disease Acute Reflexive Panel     Vitamin B12 Rickettsia Rickettsii COFF[facility] Spotted Fever -RMSF) Antibodies IgG & IgM by IFA     Ehrlichia Antibody Panel     MT DISCHARGE MEDS RECONCILED W/ CURRENT OUTPATIENT MED LIST     CANCELED: B. Burgdorferi Antibodies by WB     CANCELED: YADI 2 - Anti SSA & Anti SSB       Her neurological examination today was unremarkable. Based upon her history and examination I have a low suspicion for a primary neurological etiology. The swelling of the right eyelid could certainly be seen in an infectious etiology such as a tick bit or inflammatory/allergic reaction is possible. At this time she will be scheduled for labs as below. She will be given a course of Doxycycline for possible tick borne illnesses. The patient indicated understanding of the management plan. She is to call following testing to determine what further management is warranted. Plan    Orders Placed This Encounter   Procedures    Heavy Metal Blood Panel    Lupus (LE) panel w/ reflex    LYME (B.BURGDORFERI) PCR    Lyme Disease Acute Reflexive Panel    Vitamin B12    Rickettsia Rickettsii ([place] Spotted Fever -RMSF) Antibodies IgG & IgM by IFA    Ehrlichia Antibody Panel    MT DISCHARGE MEDS RECONCILED W/ CURRENT OUTPATIENT MED LIST       Orders Placed This Encounter   Medications    doxycycline hyclate (VIBRA-TABS) 100 MG tablet     Sig: Take 1 tablet by mouth 2 times daily for 7 days     Dispense:  14 tablet     Refill:  0       Return if symptoms worsen or fail to improve. EMR Dragon/transcription disclaimer:Significant part of this  encounter note is electronic transcription/translation of spoken language to printed text. The electronic translation of spoken language may be erroneous, or at times, nonsensical words or phrases may be inadvertently transcribed.  Although I have reviewed the note for such errors, some may still exist.

## 2021-07-14 LAB — LYME, EIA: 0.49 LIV (ref 0–1.2)

## 2021-07-15 LAB
ANA IGG, ELISA: NORMAL
ARSENIC BLOOD: <10 UG/L
C3 COMPLEMENT: 166 MG/DL (ref 88–201)
C4 COMPLEMENT: 30 MG/DL (ref 10–40)
CADMIUM: <1 UG/L
EHRLICHIA CHAFFEENSIS AB, IGG: NORMAL
EHRLICHIA CHAFFEENSIS AB, IGM: NORMAL
LEAD LEVEL BLOOD: <2 UG/DL
MERCURY BLOOD: <2.5 UG/L
RHEUMATOID FACTOR: <10 IU/ML (ref 0–14)
ROCKY MOUNTAIN SPOTTED FEVER AB IGM: NORMAL
ROCKY MOUNTAIN SPOTTED FEVER ANTIBODY IGG: NORMAL

## 2021-08-17 ENCOUNTER — PROCEDURE VISIT (OUTPATIENT)
Dept: ENT CLINIC | Age: 61
End: 2021-08-17
Payer: COMMERCIAL

## 2021-08-17 ENCOUNTER — OFFICE VISIT (OUTPATIENT)
Dept: ENT CLINIC | Age: 61
End: 2021-08-17
Payer: COMMERCIAL

## 2021-08-17 VITALS
HEIGHT: 66 IN | WEIGHT: 195 LBS | BODY MASS INDEX: 31.34 KG/M2 | DIASTOLIC BLOOD PRESSURE: 64 MMHG | SYSTOLIC BLOOD PRESSURE: 110 MMHG

## 2021-08-17 DIAGNOSIS — R42 DIZZINESS: Primary | ICD-10-CM

## 2021-08-17 DIAGNOSIS — R42 DIZZINESS OF UNKNOWN ETIOLOGY: Primary | ICD-10-CM

## 2021-08-17 PROCEDURE — 99202 OFFICE O/P NEW SF 15 MIN: CPT | Performed by: PHYSICIAN ASSISTANT

## 2021-08-17 PROCEDURE — 92567 TYMPANOMETRY: CPT | Performed by: AUDIOLOGIST

## 2021-08-17 PROCEDURE — 92557 COMPREHENSIVE HEARING TEST: CPT | Performed by: AUDIOLOGIST

## 2021-08-17 RX ORDER — MECLIZINE HYDROCHLORIDE 25 MG/1
TABLET ORAL
COMMUNITY
Start: 2021-07-27

## 2021-08-17 ASSESSMENT — ENCOUNTER SYMPTOMS
VOICE CHANGE: 0
RHINORRHEA: 0
SINUS PAIN: 0
SORE THROAT: 0
FACIAL SWELLING: 0
EYE PAIN: 0
TROUBLE SWALLOWING: 0
SINUS PRESSURE: 0
EYE DISCHARGE: 0

## 2021-08-17 NOTE — PROGRESS NOTES
History   Isabella Genao is a 64 y.o. female who presented to the clinic this date with complaints of imbalance, lightheadedness, fatigue, double vision and brain fog. She reported symptoms have been ongoing for about 2 months. She denied changes in hearing. She reported occasional tinnitus but noted that has been long standing and she does not feel it is related to dizziness. She denied aural fullness. She denied dizziness with positional changes and laying down/turning in bed. Summary   Tympanometry consistent with normal TM mobility bilaterally. Pure tone testing indicated mild SNHL at 2 and 4 kHz bilaterally. Word recognition scores were excellent bilaterally. Results   Otoscopy:    Right: Clear EAC/Normal TM   Left: Clear EAC/Normal TM    Audiometry:    Right: Mild SNHL at 2 and 4 kHz   Left: Mild SNHL at 2 and 4 kHz         Tympanometry:     Right: Type A   Left: Type A    Plan   Results of today's testing were discussed with Ms. Warren Hampton and the following recommendations were made:    1. Follow up with ENT as scheduled.         Audiogram and Acoustic Immittance

## 2021-08-17 NOTE — PROGRESS NOTES
[facility] OTOLARYNGOLOGY/ENT  Ms. Jacky Robin is a pleasant 62-year-old  female that was followed by Dr. Joselin Cortez and by Dr. Luis Simms due to problems with imbalance as well as blurred vision. She reports the onset began about a month ago and was noted after she was cleaning up limbs in her yard. She underwent MRI of the head that was unremarkable as well as a close evaluation by Dr. Luis Simms that was unremarkable. Now she is referred for possible vertigo. Overall she reports that she is improving with no therapy at this time and believes this may be a viral etiology. Overall this does not correlate to Covid. Allergies: Piper, Pcn [penicillins], and Sulfa antibiotics      Current Outpatient Medications   Medication Sig Dispense Refill    meclizine (ANTIVERT) 25 MG tablet       diazepam (VALIUM) 5 MG tablet Take 5 mg by mouth nightly. Elder Rued pantoprazole (PROTONIX) 40 MG tablet Take 40 mg by mouth daily       Cholecalciferol (VITAMIN D3) 1.25 MG (89576 UT) CAPS Take by mouth (Patient not taking: Reported on 8/17/2021)      rizatriptan (MAXALT) 10 MG tablet Take 1 tablet by mouth once as needed for Migraine May repeat in 2 hours if needed 18 tablet 2     No current facility-administered medications for this visit. Past Surgical History:   Procedure Laterality Date    KIDNEY STONE SURGERY      TONSILLECTOMY         Past Medical History:   Diagnosis Date    Depression     seasonal    Dizziness     GERD (gastroesophageal reflux disease)     Kidney stone        Family History   Problem Relation Age of Onset    Depression Mother     Alzheimer's Disease Mother        Social History     Tobacco Use    Smoking status: Never Smoker    Smokeless tobacco: Never Used   Substance Use Topics    Alcohol use: No           REVIEW OF SYSTEMS:  all other systems reviewed and are negative  Review of Systems   Constitutional: Negative for chills and fever.    HENT: Negative for congestion, dental problem, ear discharge, ear pain, facial swelling, hearing loss, nosebleeds, postnasal drip, rhinorrhea, sinus pressure, sinus pain, sneezing, sore throat, tinnitus, trouble swallowing and voice change. Eyes: Positive for visual disturbance (Bilateral blurry vision sporadically with a normal ophthalmologic exam.). Negative for pain and discharge. Neurological: Positive for dizziness (balance by hx. ) and headaches. Negative for seizures, syncope and speech difficulty. Comments:     PHYSICAL EXAM:    /64   Ht 5' 6\" (1.676 m)   Wt 195 lb (88.5 kg)   BMI 31.47 kg/m²   Body mass index is 31.47 kg/m². General Appearance: well developed  and well nourished  Head/ Face: normocephalic and atraumatic  Vocal Quality: good/ normal  Ears: Right Ear: External: external ears normal Otoscopy Ear Canal: canal clear Otoscopy TM: TM's normal and TM's mobile Left Ear: External: external ears normal Otoscopy Ear Canal: canal clear Otoscopy TM: TM's normal and TM's mobile  Hearing: grossly intact  Nose: nares normal and septum midline  Neck: supple and adenopathy none palpable  Thyroid: normal and nodules No    Assessment & Plan:    Problem List Items Addressed This Visit     Dizziness of unknown etiology - Primary      Dizziness from unknown etiology possible viral syndrome with improvement  Plan: Overall this does not sound to be suspicious for BPPV or Ménière's disease. I recommend the patient continue her meclizine as needed and give it time for this to resolve. If she has recurrence with her symptoms, would recommend a possible evaluation at a tertiary care center. She is to follow-up with me as needed. She is reminded to call if she has any questions or problems. No orders of the defined types were placed in this encounter. No orders of the defined types were placed in this encounter.       Electronically signed by Mauro Albright PA-C on 8/17/21 at 8:51 PM CDT          Please note that this chart was generated using dragon dictation software. Although every effort was made to ensure the accuracy of this automated transcription, some errors in transcription may have occurred.

## 2021-08-18 NOTE — ASSESSMENT & PLAN NOTE
Dizziness from unknown etiology possible viral syndrome with improvement  Plan: Overall this does not sound to be suspicious for BPPV or Ménière's disease. I recommend the patient continue her meclizine as needed and give it time for this to resolve. If she has recurrence with her symptoms, would recommend a possible evaluation at a tertiary care center. She is to follow-up with me as needed. She is reminded to call if she has any questions or problems.

## 2022-05-19 ENCOUNTER — TRANSCRIBE ORDERS (OUTPATIENT)
Dept: LAB | Facility: HOSPITAL | Age: 62
End: 2022-05-19

## 2022-05-19 DIAGNOSIS — Z11.59 SCREENING FOR VIRAL DISEASE: Primary | ICD-10-CM

## 2022-05-20 ENCOUNTER — LAB (OUTPATIENT)
Dept: LAB | Facility: HOSPITAL | Age: 62
End: 2022-05-20

## 2022-05-20 ENCOUNTER — PRE-ADMISSION TESTING (OUTPATIENT)
Dept: PREADMISSION TESTING | Facility: HOSPITAL | Age: 62
End: 2022-05-20

## 2022-05-20 VITALS
SYSTOLIC BLOOD PRESSURE: 131 MMHG | BODY MASS INDEX: 31.6 KG/M2 | OXYGEN SATURATION: 100 % | DIASTOLIC BLOOD PRESSURE: 83 MMHG | HEART RATE: 63 BPM | WEIGHT: 196.65 LBS | RESPIRATION RATE: 18 BRPM | HEIGHT: 66 IN

## 2022-05-20 LAB
DEPRECATED RDW RBC AUTO: 47.4 FL (ref 37–54)
ERYTHROCYTE [DISTWIDTH] IN BLOOD BY AUTOMATED COUNT: 14.6 % (ref 12.3–15.4)
HCT VFR BLD AUTO: 44.8 % (ref 34–46.6)
HGB BLD-MCNC: 13.8 G/DL (ref 12–15.9)
MCH RBC QN AUTO: 27.6 PG (ref 26.6–33)
MCHC RBC AUTO-ENTMCNC: 30.8 G/DL (ref 31.5–35.7)
MCV RBC AUTO: 89.6 FL (ref 79–97)
PLATELET # BLD AUTO: 334 10*3/MM3 (ref 140–450)
PMV BLD AUTO: 9.5 FL (ref 6–12)
RBC # BLD AUTO: 5 10*6/MM3 (ref 3.77–5.28)
SARS-COV-2 ORF1AB RESP QL NAA+PROBE: NOT DETECTED
WBC NRBC COR # BLD: 4.42 10*3/MM3 (ref 3.4–10.8)

## 2022-05-20 PROCEDURE — 85027 COMPLETE CBC AUTOMATED: CPT

## 2022-05-20 PROCEDURE — U0004 COV-19 TEST NON-CDC HGH THRU: HCPCS | Performed by: PODIATRIST

## 2022-05-20 PROCEDURE — C9803 HOPD COVID-19 SPEC COLLECT: HCPCS

## 2022-05-20 PROCEDURE — 36415 COLL VENOUS BLD VENIPUNCTURE: CPT

## 2022-05-20 RX ORDER — RIZATRIPTAN BENZOATE 10 MG/1
10 TABLET ORAL ONCE AS NEEDED
COMMUNITY

## 2022-05-20 RX ORDER — PANTOPRAZOLE SODIUM 40 MG/1
40 TABLET, DELAYED RELEASE ORAL DAILY
COMMUNITY
End: 2022-10-26

## 2022-05-20 RX ORDER — DIAZEPAM 5 MG/1
5 TABLET ORAL NIGHTLY PRN
COMMUNITY
End: 2022-10-26

## 2022-05-20 NOTE — DISCHARGE INSTRUCTIONS
Before you come to the hospital        Arrival time: AS DIRECTED BY OFFICE     YOU MAY TAKE THE FOLLOWING MEDICATION(S) THE MORNING OF SURGERY WITH A SIP OF WATER: none            ALL OTHER HOME MEDICATION CHECK WITH YOUR PHYSICIAN (especially if you are taking diabetes medicines or blood thinners)    Do not take any Erectile Dysfunction medications (EX: CIALIS, VIAGRA) 24 hours prior to surgery      If you were given and instructed to use a germ- killing soap, use as directed the night before surgery and the morning of surgery before coming to the hospital.             Eating and drinking restrictions prior to scheduled arrival time    2 Hours before arrival time STOP   Drinking Clear liquids (water, apple juice-no pulp)     6 Hours before arrival time STOP   Milk or drinks that contain milk, full liquids    6 Hours before arrival time STOP   Light meals or foods, such as toast or cereal    8 Hours before arrival time STOP   Heavy foods, such as meat, fried foods, or fatty foods    (It is extremely important that you follow these guidelines to prevent delay or cancelation of your procedure)     Clear Liquids  Water and flavored water                                                                      Clear Fruit juices, such as cranberry juice and apple juice.  Black coffee (NO cream of any kind, including powdered).  Plain tea  Clear bouillon or broth.  Flavored gelatin.  Soda.  Gatorade or Powerade.  Full liquid examples  Juices that have pulp.  Frozen ice pops that contain fruit pieces.  Coffee with creamer  Milk.  Yogurt.              MANAGING PAIN AFTER SURGERY    We know you are probably wondering what your pain will be like after surgery.  Following surgery it is unrealistic to expect you will not have pain.   Pain is how our bodies let us know that something is wrong or cautions us to be careful.  That said, our goal is to make your pain tolerable.    Methods we may use to treat your pain include (oral or  IV medications, PCAs, epidurals, nerve blocks, etc.)   While some procedures require IV pain medications for a short time after surgery, transitioning to pain medications by mouth allows for better management of pain.   Your nurse will encourage you to take oral pain medications whenever possible.  IV medications work almost immediately, but only last a short while.  Taking medications by mouth allows for a more constant level of medication in your blood stream for a longer period of time.      Once your pain is out of control it is harder to get back under control.  It is important you are aware when your next dose of pain medication is due.  If you are admitted, your nurse may write the time of your next dose on the white board in your room to help you remember.      We are interested in your pain and encourage you to inform us about aggravating factors during your visit.   Many times a simple repositioning every few hours can make a big difference.    If your physician says it is okay, do not let your pain prevent you from getting out of bed. Be sure to call your nurse for assistance prior to getting up so you do not fall.      Before surgery, please decide your tolerable pain goal.  These faces help describe the pain ratings we use on a 0-10 scale.   Be prepared to tell us your goal and whether or not you take pain or anxiety medications at home.

## 2022-05-24 ENCOUNTER — ANESTHESIA (OUTPATIENT)
Dept: PERIOP | Facility: HOSPITAL | Age: 62
End: 2022-05-24

## 2022-05-24 ENCOUNTER — HOSPITAL ENCOUNTER (OUTPATIENT)
Facility: HOSPITAL | Age: 62
Setting detail: HOSPITAL OUTPATIENT SURGERY
Discharge: HOME | End: 2022-05-24
Attending: PODIATRIST | Admitting: PODIATRIST

## 2022-05-24 ENCOUNTER — ANESTHESIA EVENT (OUTPATIENT)
Dept: PERIOP | Facility: HOSPITAL | Age: 62
End: 2022-05-24

## 2022-05-24 VITALS
HEART RATE: 65 BPM | SYSTOLIC BLOOD PRESSURE: 124 MMHG | RESPIRATION RATE: 16 BRPM | OXYGEN SATURATION: 97 % | TEMPERATURE: 97.4 F | DIASTOLIC BLOOD PRESSURE: 59 MMHG

## 2022-05-24 DIAGNOSIS — M72.2 PLANTAR FASCIITIS OF LEFT FOOT: Primary | ICD-10-CM

## 2022-05-24 PROCEDURE — 25010000002 FENTANYL CITRATE (PF) 50 MCG/ML SOLUTION: Performed by: ANESTHESIOLOGY

## 2022-05-24 PROCEDURE — 25010000002 ROPIVACAINE PER 1 MG: Performed by: PODIATRIST

## 2022-05-24 PROCEDURE — 25010000002 DEXAMETHASONE PER 1 MG: Performed by: NURSE ANESTHETIST, CERTIFIED REGISTERED

## 2022-05-24 PROCEDURE — 25010000002 FENTANYL CITRATE (PF) 100 MCG/2ML SOLUTION: Performed by: NURSE ANESTHETIST, CERTIFIED REGISTERED

## 2022-05-24 PROCEDURE — 25010000002 PROPOFOL 10 MG/ML EMULSION: Performed by: NURSE ANESTHETIST, CERTIFIED REGISTERED

## 2022-05-24 PROCEDURE — 25010000002 CEFAZOLIN PER 500 MG: Performed by: PODIATRIST

## 2022-05-24 RX ORDER — SODIUM CHLORIDE 0.9 % (FLUSH) 0.9 %
10 SYRINGE (ML) INJECTION EVERY 12 HOURS SCHEDULED
Status: DISCONTINUED | OUTPATIENT
Start: 2022-05-24 | End: 2022-05-24 | Stop reason: HOSPADM

## 2022-05-24 RX ORDER — ROPIVACAINE HYDROCHLORIDE 5 MG/ML
INJECTION, SOLUTION EPIDURAL; INFILTRATION; PERINEURAL AS NEEDED
Status: DISCONTINUED | OUTPATIENT
Start: 2022-05-24 | End: 2022-05-24 | Stop reason: HOSPADM

## 2022-05-24 RX ORDER — SODIUM CHLORIDE, SODIUM LACTATE, POTASSIUM CHLORIDE, CALCIUM CHLORIDE 600; 310; 30; 20 MG/100ML; MG/100ML; MG/100ML; MG/100ML
100 INJECTION, SOLUTION INTRAVENOUS CONTINUOUS
Status: DISCONTINUED | OUTPATIENT
Start: 2022-05-24 | End: 2022-05-24 | Stop reason: HOSPADM

## 2022-05-24 RX ORDER — OXYCODONE HYDROCHLORIDE AND ACETAMINOPHEN 5; 325 MG/1; MG/1
1 TABLET ORAL EVERY 4 HOURS PRN
Qty: 30 TABLET | Refills: 0 | Status: SHIPPED | OUTPATIENT
Start: 2022-05-24 | End: 2022-10-19 | Stop reason: SDUPTHER

## 2022-05-24 RX ORDER — IBUPROFEN 600 MG/1
600 TABLET ORAL ONCE AS NEEDED
Status: DISCONTINUED | OUTPATIENT
Start: 2022-05-24 | End: 2022-05-24 | Stop reason: HOSPADM

## 2022-05-24 RX ORDER — LIDOCAINE HYDROCHLORIDE 20 MG/ML
INJECTION, SOLUTION EPIDURAL; INFILTRATION; INTRACAUDAL; PERINEURAL AS NEEDED
Status: DISCONTINUED | OUTPATIENT
Start: 2022-05-24 | End: 2022-05-24 | Stop reason: SURG

## 2022-05-24 RX ORDER — ONDANSETRON 2 MG/ML
4 INJECTION INTRAMUSCULAR; INTRAVENOUS ONCE AS NEEDED
Status: DISCONTINUED | OUTPATIENT
Start: 2022-05-24 | End: 2022-05-24 | Stop reason: HOSPADM

## 2022-05-24 RX ORDER — LABETALOL HYDROCHLORIDE 5 MG/ML
5 INJECTION, SOLUTION INTRAVENOUS
Status: DISCONTINUED | OUTPATIENT
Start: 2022-05-24 | End: 2022-05-24 | Stop reason: HOSPADM

## 2022-05-24 RX ORDER — LIDOCAINE HYDROCHLORIDE 10 MG/ML
0.5 INJECTION, SOLUTION EPIDURAL; INFILTRATION; INTRACAUDAL; PERINEURAL ONCE AS NEEDED
Status: DISCONTINUED | OUTPATIENT
Start: 2022-05-24 | End: 2022-05-24 | Stop reason: HOSPADM

## 2022-05-24 RX ORDER — EPHEDRINE SULFATE 50 MG/ML
INJECTION, SOLUTION INTRAVENOUS AS NEEDED
Status: DISCONTINUED | OUTPATIENT
Start: 2022-05-24 | End: 2022-05-24 | Stop reason: SURG

## 2022-05-24 RX ORDER — NEOSTIGMINE METHYLSULFATE 5 MG/5 ML
SYRINGE (ML) INTRAVENOUS AS NEEDED
Status: DISCONTINUED | OUTPATIENT
Start: 2022-05-24 | End: 2022-05-24 | Stop reason: SURG

## 2022-05-24 RX ORDER — ACETAMINOPHEN 500 MG
1000 TABLET ORAL ONCE
Status: COMPLETED | OUTPATIENT
Start: 2022-05-24 | End: 2022-05-24

## 2022-05-24 RX ORDER — MAGNESIUM HYDROXIDE 1200 MG/15ML
LIQUID ORAL AS NEEDED
Status: DISCONTINUED | OUTPATIENT
Start: 2022-05-24 | End: 2022-05-24 | Stop reason: HOSPADM

## 2022-05-24 RX ORDER — OXYCODONE AND ACETAMINOPHEN 10; 325 MG/1; MG/1
1 TABLET ORAL ONCE AS NEEDED
Status: DISCONTINUED | OUTPATIENT
Start: 2022-05-24 | End: 2022-05-24 | Stop reason: HOSPADM

## 2022-05-24 RX ORDER — SODIUM CHLORIDE 0.9 % (FLUSH) 0.9 %
10 SYRINGE (ML) INJECTION AS NEEDED
Status: DISCONTINUED | OUTPATIENT
Start: 2022-05-24 | End: 2022-05-24 | Stop reason: HOSPADM

## 2022-05-24 RX ORDER — DROPERIDOL 2.5 MG/ML
0.62 INJECTION, SOLUTION INTRAMUSCULAR; INTRAVENOUS ONCE AS NEEDED
Status: DISCONTINUED | OUTPATIENT
Start: 2022-05-24 | End: 2022-05-24 | Stop reason: HOSPADM

## 2022-05-24 RX ORDER — SODIUM CHLORIDE 0.9 % (FLUSH) 0.9 %
3-10 SYRINGE (ML) INJECTION AS NEEDED
Status: DISCONTINUED | OUTPATIENT
Start: 2022-05-24 | End: 2022-05-24 | Stop reason: HOSPADM

## 2022-05-24 RX ORDER — ROCURONIUM BROMIDE 10 MG/ML
INJECTION, SOLUTION INTRAVENOUS AS NEEDED
Status: DISCONTINUED | OUTPATIENT
Start: 2022-05-24 | End: 2022-05-24 | Stop reason: SURG

## 2022-05-24 RX ORDER — FENTANYL CITRATE 50 UG/ML
INJECTION, SOLUTION INTRAMUSCULAR; INTRAVENOUS AS NEEDED
Status: DISCONTINUED | OUTPATIENT
Start: 2022-05-24 | End: 2022-05-24 | Stop reason: SURG

## 2022-05-24 RX ORDER — ONDANSETRON 4 MG/1
4 TABLET, FILM COATED ORAL EVERY 4 HOURS
Qty: 42 TABLET | Refills: 0 | Status: SHIPPED | OUTPATIENT
Start: 2022-05-24 | End: 2022-12-08

## 2022-05-24 RX ORDER — DOCUSATE SODIUM 100 MG/1
100 CAPSULE, LIQUID FILLED ORAL 2 TIMES DAILY
Qty: 60 CAPSULE | Refills: 0 | Status: SHIPPED | OUTPATIENT
Start: 2022-05-24

## 2022-05-24 RX ORDER — FENTANYL CITRATE 50 UG/ML
25 INJECTION, SOLUTION INTRAMUSCULAR; INTRAVENOUS
Status: DISCONTINUED | OUTPATIENT
Start: 2022-05-24 | End: 2022-05-24 | Stop reason: HOSPADM

## 2022-05-24 RX ORDER — OXYCODONE AND ACETAMINOPHEN 7.5; 325 MG/1; MG/1
2 TABLET ORAL EVERY 4 HOURS PRN
Status: DISCONTINUED | OUTPATIENT
Start: 2022-05-24 | End: 2022-05-24 | Stop reason: HOSPADM

## 2022-05-24 RX ORDER — DEXAMETHASONE SODIUM PHOSPHATE 4 MG/ML
INJECTION, SOLUTION INTRA-ARTICULAR; INTRALESIONAL; INTRAMUSCULAR; INTRAVENOUS; SOFT TISSUE AS NEEDED
Status: DISCONTINUED | OUTPATIENT
Start: 2022-05-24 | End: 2022-05-24 | Stop reason: SURG

## 2022-05-24 RX ORDER — NALOXONE HCL 0.4 MG/ML
0.4 VIAL (ML) INJECTION AS NEEDED
Status: DISCONTINUED | OUTPATIENT
Start: 2022-05-24 | End: 2022-05-24 | Stop reason: HOSPADM

## 2022-05-24 RX ORDER — SODIUM CHLORIDE 0.9 % (FLUSH) 0.9 %
3 SYRINGE (ML) INJECTION EVERY 12 HOURS SCHEDULED
Status: DISCONTINUED | OUTPATIENT
Start: 2022-05-24 | End: 2022-05-24 | Stop reason: HOSPADM

## 2022-05-24 RX ORDER — SODIUM CHLORIDE, SODIUM LACTATE, POTASSIUM CHLORIDE, CALCIUM CHLORIDE 600; 310; 30; 20 MG/100ML; MG/100ML; MG/100ML; MG/100ML
100 INJECTION, SOLUTION INTRAVENOUS CONTINUOUS PRN
Status: DISCONTINUED | OUTPATIENT
Start: 2022-05-24 | End: 2022-05-24 | Stop reason: HOSPADM

## 2022-05-24 RX ORDER — MIDAZOLAM HYDROCHLORIDE 1 MG/ML
1 INJECTION INTRAMUSCULAR; INTRAVENOUS
Status: DISCONTINUED | OUTPATIENT
Start: 2022-05-24 | End: 2022-05-24 | Stop reason: HOSPADM

## 2022-05-24 RX ORDER — PHENYLEPHRINE HCL IN 0.9% NACL 1 MG/10 ML
SYRINGE (ML) INTRAVENOUS AS NEEDED
Status: DISCONTINUED | OUTPATIENT
Start: 2022-05-24 | End: 2022-05-24 | Stop reason: SURG

## 2022-05-24 RX ORDER — BUPIVACAINE HCL/0.9 % NACL/PF 0.1 %
2 PLASTIC BAG, INJECTION (ML) EPIDURAL ONCE
Status: COMPLETED | OUTPATIENT
Start: 2022-05-24 | End: 2022-05-24

## 2022-05-24 RX ORDER — PROPOFOL 10 MG/ML
VIAL (ML) INTRAVENOUS AS NEEDED
Status: DISCONTINUED | OUTPATIENT
Start: 2022-05-24 | End: 2022-05-24 | Stop reason: SURG

## 2022-05-24 RX ORDER — FLUMAZENIL 0.1 MG/ML
0.2 INJECTION INTRAVENOUS AS NEEDED
Status: DISCONTINUED | OUTPATIENT
Start: 2022-05-24 | End: 2022-05-24 | Stop reason: HOSPADM

## 2022-05-24 RX ADMIN — GLYCOPYRROLATE 0.4 MG: 0.2 INJECTION INTRAMUSCULAR; INTRAVENOUS at 16:18

## 2022-05-24 RX ADMIN — EPHEDRINE SULFATE 10 MG: 50 INJECTION INTRAVENOUS at 16:04

## 2022-05-24 RX ADMIN — FENTANYL CITRATE 25 MCG: 0.05 INJECTION, SOLUTION INTRAMUSCULAR; INTRAVENOUS at 17:11

## 2022-05-24 RX ADMIN — Medication 3 MG: at 16:18

## 2022-05-24 RX ADMIN — DEXAMETHASONE SODIUM PHOSPHATE 8 MG: 4 INJECTION, SOLUTION INTRA-ARTICULAR; INTRALESIONAL; INTRAMUSCULAR; INTRAVENOUS; SOFT TISSUE at 15:40

## 2022-05-24 RX ADMIN — SODIUM CHLORIDE, POTASSIUM CHLORIDE, SODIUM LACTATE AND CALCIUM CHLORIDE 100 ML/HR: 600; 310; 30; 20 INJECTION, SOLUTION INTRAVENOUS at 11:40

## 2022-05-24 RX ADMIN — Medication 2 G: at 15:33

## 2022-05-24 RX ADMIN — Medication 100 MCG: at 15:51

## 2022-05-24 RX ADMIN — ROCURONIUM BROMIDE 35 MG: 10 SOLUTION INTRAVENOUS at 15:30

## 2022-05-24 RX ADMIN — FENTANYL CITRATE 25 MCG: 0.05 INJECTION, SOLUTION INTRAMUSCULAR; INTRAVENOUS at 17:06

## 2022-05-24 RX ADMIN — FENTANYL CITRATE 25 MCG: 0.05 INJECTION, SOLUTION INTRAMUSCULAR; INTRAVENOUS at 16:56

## 2022-05-24 RX ADMIN — OXYCODONE HYDROCHLORIDE AND ACETAMINOPHEN 2 TABLET: 7.5; 325 TABLET ORAL at 16:58

## 2022-05-24 RX ADMIN — ACETAMINOPHEN 1000 MG: 500 TABLET ORAL at 12:06

## 2022-05-24 RX ADMIN — FENTANYL CITRATE 25 MCG: 0.05 INJECTION, SOLUTION INTRAMUSCULAR; INTRAVENOUS at 17:01

## 2022-05-24 RX ADMIN — FENTANYL CITRATE 100 MCG: 50 INJECTION, SOLUTION INTRAMUSCULAR; INTRAVENOUS at 15:28

## 2022-05-24 RX ADMIN — PROPOFOL 150 MG: 10 INJECTION, EMULSION INTRAVENOUS at 15:29

## 2022-05-24 RX ADMIN — LIDOCAINE HYDROCHLORIDE 100 MG: 20 INJECTION, SOLUTION EPIDURAL; INFILTRATION; INTRACAUDAL; PERINEURAL at 15:28

## 2022-05-24 NOTE — ANESTHESIA POSTPROCEDURE EVALUATION
Patient: Yarely Cevallos    Procedure Summary     Date: 05/24/22 Room / Location: [facility] OR  /  [facility] OR    Anesthesia Start: 1526 Anesthesia Stop: 1625    Procedure: ENDOSCOPIC PLANTAR FASCIAL RELEASE, LEFT FOOT (Left Foot) Diagnosis:       Plantar fasciitis of left foot      Left foot pain      (plantar fasciitis left, foot pain)    Surgeons: Timothy Lin DPM Provider: Dequan Plaza CRNA    Anesthesia Type: general ASA Status: 2          Anesthesia Type: general    Vitals  Vitals Value Taken Time   /99 05/24/22 1719   Temp 96.6 °F (35.9 °C) 05/24/22 1625   Pulse 54 05/24/22 1720   Resp 13 05/24/22 1710   SpO2 100 % 05/24/22 1720   Vitals shown include unvalidated device data.        Post Anesthesia Care and Evaluation    Patient location during evaluation: PACU  Patient participation: complete - patient participated  Level of consciousness: awake and alert  Pain score: 0  Pain management: adequate  Airway patency: patent  Anesthetic complications: No anesthetic complications  PONV Status: none  Cardiovascular status: acceptable and stable  Respiratory status: acceptable  Hydration status: acceptable

## 2022-05-24 NOTE — ANESTHESIA PREPROCEDURE EVALUATION
Anesthesia Evaluation     Patient summary reviewed   history of anesthetic complications: PONV  NPO Solid Status: > 6 hours  NPO Liquid Status: > 4 hours           Airway   Mallampati: II  Dental      Pulmonary    (-) not a smoker  Cardiovascular - negative cardio ROS  Exercise tolerance: good (4-7 METS)    (-) pacemaker, valvular problems/murmurs, past MI, cardiac stents, CABG      Neuro/Psych  (-) seizures, CVA  GI/Hepatic/Renal/Endo    (+)  GERD,    (-) liver disease, no renal disease, diabetes    Musculoskeletal     Abdominal    Substance History      OB/GYN          Other                        Anesthesia Plan    ASA 2     general     intravenous induction     Anesthetic plan, all risks, benefits, and alternatives have been provided, discussed and informed consent has been obtained with: patient.        CODE STATUS:

## 2022-05-24 NOTE — ANESTHESIA PROCEDURE NOTES
Airway  Urgency: elective    Date/Time: 5/24/2022 3:31 PM  Airway not difficult    General Information and Staff    Patient location during procedure: OR  CRNA/CAA: Dequan Plaza CRNA  SRNA: Cooper Chacon SRNA  Indications and Patient Condition  Indications for airway management: airway protection    Preoxygenated: yes  MILS maintained throughout  Mask difficulty assessment: 1 - vent by mask    Final Airway Details  Final airway type: endotracheal airway      Successful airway: ETT  Cuffed: yes   Successful intubation technique: direct laryngoscopy  Endotracheal tube insertion site: oral  Blade: Balwinder  Blade size: 3.5  ETT size (mm): 7.0  Cormack-Lehane Classification: grade IIa - partial view of glottis  Placement verified by: chest auscultation and capnometry   Cuff volume (mL): 5  Measured from: teeth  ETT/EBT  to teeth (cm): 22  Number of attempts at approach: 1  Assessment: lips, teeth, and gum same as pre-op and atraumatic intubation

## 2022-05-24 NOTE — OP NOTE
ENDOSCOPIC PLANTAR FASCIOTOMY  Procedure Note    Yarely Cevallos  5/24/2022    Pre-op Diagnosis:   plantar fasciitis left, foot pain    Post-op Diagnosis:     Post-Op Diagnosis Codes:     * Plantar fasciitis of left foot [M72.2]     * Left foot pain [M79.672]    Procedure/CPT® Codes:      Procedure(s):  ENDOSCOPIC PLANTAR FASCIAL RELEASE, LEFT FOOT    Surgeon(s):  Timothy Lin DPM    Anesthesia: General    Staff:   Circulator: Venus Howe RN  Scrub Person: Sathya Alicia; Venus Hendrickson     was responsible for performing the following activities: Retraction and their skilled assistance was necessary for the success of this case.    Indications for procedure:  Pain left foot.  Exhausted all conservative measures.    Procedure details:  Patient brought in the operating room placed under general anesthesia.  An ankle block was performed with 30 cc 0.5% Naropin plain.  Left leg prepped prepped and draped in usual sterile fashion.  Following procedures were performed.      Procedure #1 endoscopic plantar fascial release left foot    Attention was directed to the medial aspect patient's left foot at the junction of the medial plantar skin.  A linear incision was made.  Hemostat was then introduced to elevate the subcutaneous tissues and the fascia.  The trocar cannula was then introduced until it tented the skin laterally.  A linear incision was made laterally to allow the trocar and cannula to pass.  The trocar was then removed.  The cannula was cleaned with cotton-tipped applicators.  The camera was introduced.  A probe was used to identify the medial and central bands of the plantar fascia which were then released with a triangle blade.  She still had 1 area of what felt to be tight fascia.  I did flip the cannula to look in the subcutaneous tissues to see if that was superficial to the cannula which was not.  Continue with with probe dissection to see if that was deep to the musculature which it was not.  I did  make a small accessory incision in her arch area with a #11 blade.  That band of fascia was then released.  Wounds were irrigated closure performed in layers.  Well-padded compression bandage with posterior splint was applied.    Estimated Blood Loss: none    Specimens:                None      Drains: None    Implants: Nothing was implanted during the procedure     Complications: none    Follow up:   Nonweightbearing.  3 to 5 days for follow-up.  Prescriptions for Percocet and Zofran were given.    Timothy Lin DPM     Date: 5/24/2022  Time: 16:24 CDT

## 2022-10-14 ENCOUNTER — TELEPHONE (OUTPATIENT)
Dept: OBSTETRICS AND GYNECOLOGY | Facility: CLINIC | Age: 62
End: 2022-10-14

## 2022-10-14 NOTE — TELEPHONE ENCOUNTER
Pt called and notified of appt date and times.  Pt was referred to our office for possible ovarian mass.  Pt has MRI scheduled for 10/19/22.  Pt will be seen in our office for US and office visit at 9am on 10/19/22.  Pt voiced understanding.

## 2022-10-14 NOTE — TELEPHONE ENCOUNTER
Pt referred by her PCP to f/u on possible ovarian mass/cyst.  Pt is scheduled for an MRI at [facility] on 10/19/22 by her PCP.  Pt will be seen in our office for US and provider visit on the same day.  Pt is asking if MRI is necessary at this point.  Please advise.

## 2022-10-19 ENCOUNTER — OFFICE VISIT (OUTPATIENT)
Dept: OBSTETRICS AND GYNECOLOGY | Facility: CLINIC | Age: 62
End: 2022-10-19

## 2022-10-19 ENCOUNTER — TELEPHONE (OUTPATIENT)
Dept: OBSTETRICS AND GYNECOLOGY | Facility: CLINIC | Age: 62
End: 2022-10-19

## 2022-10-19 VITALS
HEIGHT: 66 IN | SYSTOLIC BLOOD PRESSURE: 128 MMHG | BODY MASS INDEX: 31.85 KG/M2 | DIASTOLIC BLOOD PRESSURE: 78 MMHG | WEIGHT: 198.2 LBS

## 2022-10-19 DIAGNOSIS — Z01.411 ENCOUNTER FOR GYNECOLOGICAL EXAMINATION WITH ABNORMAL FINDING: Primary | ICD-10-CM

## 2022-10-19 DIAGNOSIS — N89.8 VAGINAL CYST: ICD-10-CM

## 2022-10-19 DIAGNOSIS — Z12.4 SCREENING FOR MALIGNANT NEOPLASM OF CERVIX: ICD-10-CM

## 2022-10-19 DIAGNOSIS — R19.00 PELVIC MASS IN FEMALE: ICD-10-CM

## 2022-10-19 PROCEDURE — 87624 HPV HI-RISK TYP POOLED RSLT: CPT | Performed by: OBSTETRICS & GYNECOLOGY

## 2022-10-19 PROCEDURE — 99386 PREV VISIT NEW AGE 40-64: CPT | Performed by: OBSTETRICS & GYNECOLOGY

## 2022-10-19 PROCEDURE — G0123 SCREEN CERV/VAG THIN LAYER: HCPCS | Performed by: OBSTETRICS & GYNECOLOGY

## 2022-10-19 RX ORDER — DIPHENHYDRAMINE HCL 25 MG
25 CAPSULE ORAL EVERY 6 HOURS PRN
COMMUNITY

## 2022-10-19 RX ORDER — DIAZEPAM 5 MG/1
5 TABLET ORAL NIGHTLY
COMMUNITY

## 2022-10-19 RX ORDER — PROMETHAZINE HYDROCHLORIDE 25 MG/1
25 TABLET ORAL DAILY PRN
COMMUNITY
Start: 2022-09-19

## 2022-10-19 RX ORDER — PANTOPRAZOLE SODIUM 40 MG/1
TABLET, DELAYED RELEASE ORAL
COMMUNITY

## 2022-10-19 NOTE — PROGRESS NOTES
Leonardo Wood MD  [facility]  [address]  [place] [address]  Office [phone]  Fax [phone]      [facility]  Yarely Cevallos  : 1960  MRN: [id number]    Subjective   Subjective     Chief Complaint   Patient presents with   • Ovarian Cyst     Pt had pelvic pain for about a month. Pt went to PCP and found LOV enlarged and heterogenous appearing. Pt still has back pain and occasional pelvic pain. Pt had some blood work done at PCP and brought those records with her. Pt has mammogram scheduled for next month. Pt has not had pap in few years. Pt has history of cyst in breast that were drained regularly years ago. Pt had colonoscopy done 4 years ago.        History of Present Illness  Yarely Cevallos is a 61 y.o. female , , who comes to the office today for consultation. Patient was undergoing evaluation for hematuria and on bladder ultrasound, she was noted to have a left pelvic mass. She notes 5-6 weeks of occasional pelvic pain along with new onset abdominal bloating and early satiety. No abnormal or postmenopausal vaginal bleeding. No nausea or vomiting. No changes in weight. No urinary or bowel complaints currently. She was being evaluated for hematuria secondary to new onset bladder cancer in her brother and that her urine was noted to be darker than usual. She has not seen urology yet.       Review of Systems   Constitutional: Positive for appetite change.   Gastrointestinal: Positive for abdominal pain.   Genitourinary: Positive for pelvic pain. Negative for difficulty urinating, genital sores, menstrual problem, vaginal bleeding and vaginal discharge.   All other systems reviewed and are negative.       OB hx:  OB History    Para Term  AB Living   0 0 0 0 0 0   SAB IAB Ectopic Molar Multiple Live Births   0 0 0 0 0 0        GYN hx:  Date of LMP: No LMP recorded. Patient is postmenopausal.  Menopause: age 50-53  Date/Result of last Pap smear: she does not know  "when her last PAP was done and she reports her last PAP was normal   History of abnormal PAP: Yes. Details: h/o colposcopy, unsure if LEEP/CKC/cryotherapy  Date/Result of last mammogram: she reports her last mammogram was normal and dense breasts  History of Abnormal Mammogram: Yes. Details: dense breasts  Date/Result of last colonoscopy: Repeat colonoscopy recommended in  Next 5 years  History of Abnormal colonoscopy: No  Date/Result of last DEXA: None  History of Abnormal DEXA: No  History of Sexually Transmitted Infection: No  FMH of Breast, Uterine, Ovarian, or Colon cancer: No  Other OB/GYN History: n/a    Personal History     History Review Reviewed Comments   Past Medical History:  [x]     Past Surgical History: [x]     Family History: [x]     Social History: [x]       Current Outpatient Medications   Medication Instructions   • diazePAM (VALIUM) 5 MG tablet diazepam 5 mg tablet   TAKE 1 TABLET BY MOUTH ONCE DAILY AT BEDTIME FOR 10 DAYS   • diazePAM (VALIUM) 5 mg, Oral, Nightly PRN   • diphenhydrAMINE (BENADRYL) 25 mg, Oral, Every 6 Hours PRN   • docusate sodium (COLACE) 100 mg, Oral, 2 Times Daily   • ondansetron (ZOFRAN) 4 mg, Oral, Every 4 Hours   • pantoprazole (PROTONIX) 40 MG EC tablet pantoprazole 40 mg tablet,delayed release   • pantoprazole (PROTONIX) 40 mg, Oral, Daily   • promethazine (PHENERGAN) 25 MG tablet No dose, route, or frequency recorded.   • rizatriptan (MAXALT) 10 mg, Oral, Once As Needed, May repeat in 2 hours if needed       Allergies   Allergen Reactions   • Codeine Rash   • Penicillins Rash   • Sulfa Antibiotics Rash       Objective    Objective     Vitals:   Visit Vitals  /78   Ht 167.6 cm (66\")   Wt 89.9 kg (198 lb 3.2 oz)   BMI 31.99 kg/m²        Physical Exam  Vitals reviewed. Exam conducted with a chaperone present.   Constitutional:       General: She is not in acute distress.     Appearance: Normal appearance. She is obese. She is not ill-appearing.   HENT:      Head: " Normocephalic and atraumatic.      Nose: No congestion or rhinorrhea.   Eyes:      General: No scleral icterus.        Right eye: No discharge.         Left eye: No discharge.      Extraocular Movements: Extraocular movements intact.      Conjunctiva/sclera: Conjunctivae normal.   Pulmonary:      Effort: Pulmonary effort is normal. No accessory muscle usage or respiratory distress.   Genitourinary:     General: Normal vulva.      Exam position: Lithotomy position.      Pubic Area: No rash or pubic lice.       Labia:         Right: No rash, tenderness or lesion.         Left: No rash, tenderness or lesion.       Urethra: No prolapse or urethral lesion.      Vagina: Normal.      Cervix: Normal.      Uterus: Normal.       Adnexa: Right adnexa normal.        Left: Mass, tenderness and fullness present.       Rectum: No external hemorrhoid.          Comments: Consent for exam obtained verbally from patient.  Musculoskeletal:      Right lower leg: No edema.      Left lower leg: No edema.   Skin:     General: Skin is warm and dry.      Coloration: Skin is not ashen, cyanotic or jaundiced.   Neurological:      General: No focal deficit present.      Mental Status: She is alert and oriented to person, place, and time.   Psychiatric:         Mood and Affect: Mood normal.         Behavior: Behavior is cooperative.                Result Review    US Non-ob Transvaginal (10/19/2022 09:33)  -uterus 4 cm, endometrium 2mm  -left adnexal mass, solid appearance measuring 3.3 cm; appears pedunculated and similar to a fibroid  -right ovary unremarkable    SCANNED - IMAGING (10/12/2022)  -enlarged 3 cm heterogeneous ovary on the left        Assessment & Plan   Assessment / Plan     Diagnoses and all orders for this visit:    1. Encounter for gynecological examination with abnormal finding (Primary)    2. Screening for malignant neoplasm of cervix  -     Liquid-based Pap Smear, Screening    3. Pelvic mass in female  -     Ovarian  Malignancy Risk-DARINEL    4. Vaginal cyst        Discussion:   BMI Body mass index is 31.99 kg/m²..   Colonoscopy: Up to date.    Mammogram: Up to date.  DEXA:  age 67.  Pap smear, per ASCCP guidelines, Done today.  STI screening: declines  Contraception: declines    Encouraged self breast awareness.  Encouraged proactive weight management and importance of maintaining a healthy weight.   Encouraged regular exercise and the importance of same, in regards to a healthy heart as well as helping to maintain her weight and improving her mental health.      Check DARINEL. Discussed differential including ovarian malignancy. Discussed that there is a concern for this given her symptoms recently. Discussed if DARINEL is also concerning, will send to GYN Oncology. Discussed that this is a high chance of this. If unlikely to be cancer based on DARINEL, discussed removal here.     Follow-up: Return in about 1 week (around 10/26/2022) for GYN f/u.    Leonardo Wood MD

## 2022-10-19 NOTE — TELEPHONE ENCOUNTER
Patient asked me before leaving the office if Dr. Wood planned on doing a CT scan or any additional imaging on her pelvis. I informed pt I would ask Dr. Wood and give her a call back since she was on her way out of the office. Spoke with Dr. Wood, and he confirmed he does not plan on doing additional imaging at this point. Pt had some blood work drawn today, so we will call her with the result of that and the pap. However, pt should keep her follow up scheduled for 10/26/22. At that appointment, patient and Dr. Wood will discuss care plan moving forward. Called patient and informed her of this, and she voiced understanding and denied questions.

## 2022-10-20 LAB
GEN CATEG CVX/VAG CYTO-IMP: NORMAL
HPV I/H RISK 4 DNA CVX QL PROBE+SIG AMP: NOT DETECTED
LAB AP CASE REPORT: NORMAL
LAB AP GYN ADDITIONAL INFORMATION: NORMAL
Lab: NORMAL
PATH INTERP SPEC-IMP: NORMAL
STAT OF ADQ CVX/VAG CYTO-IMP: NORMAL

## 2022-10-21 ENCOUNTER — PATIENT ROUNDING (BHMG ONLY) (OUTPATIENT)
Dept: OBSTETRICS AND GYNECOLOGY | Facility: CLINIC | Age: 62
End: 2022-10-21

## 2022-10-21 LAB
CANCER AG125 SERPL-ACNC: 8.4 U/ML (ref 0–38.1)
HE4 SERPL-SCNC: 57.4 PMOL/L (ref 0–105.2)
LABORATORY COMMENT REPORT: NORMAL
POSTMENOPAUSAL INTERP: LOW: NORMAL
PREMENOPAUSAL INTERP: LOW: NORMAL
ROMA SCORE POSTMEN SERPL: 0.89
ROMA SCORE PREMEN SERPL: 0.97

## 2022-10-21 NOTE — PROGRESS NOTES
October 21, 2022    Hello, may I speak with Yarely Cevallos?    My name is Melanie    I am  with [facility]  [address], [address]  [place] [address] [phone].    Before we get started may I verify your date of birth? 1960    I am calling to officially welcome you to our practice and ask about your recent visit. Is this a good time to talk? yes    Tell me about your visit with us. What things went well?  it was good       We're always looking for ways to make our patients' experiences even better. Do you have recommendations on ways we may improve?  no    Overall were you satisfied with your first visit to our practice? yes       I appreciate you taking the time to speak with me today. Is there anything else I can do for you? no      Thank you, and have a great day.

## 2022-10-26 ENCOUNTER — OFFICE VISIT (OUTPATIENT)
Dept: OBSTETRICS AND GYNECOLOGY | Facility: CLINIC | Age: 62
End: 2022-10-26

## 2022-10-26 VITALS
SYSTOLIC BLOOD PRESSURE: 122 MMHG | DIASTOLIC BLOOD PRESSURE: 84 MMHG | WEIGHT: 198.3 LBS | BODY MASS INDEX: 31.87 KG/M2 | HEIGHT: 66 IN

## 2022-10-26 DIAGNOSIS — R19.00 PELVIC MASS IN FEMALE: Primary | ICD-10-CM

## 2022-10-26 DIAGNOSIS — R31.9 HEMATURIA, UNSPECIFIED TYPE: ICD-10-CM

## 2022-10-26 PROCEDURE — 99213 OFFICE O/P EST LOW 20 MIN: CPT | Performed by: OBSTETRICS & GYNECOLOGY

## 2022-10-26 NOTE — PROGRESS NOTES
"    Leonardo Wood MD  [facility]  [address]  [place] [address]  Office [phone]  Fax [phone]      [facility]  Yarely Cevallos  : 1960  MRN: [id number]    Subjective   Subjective     Chief Complaint   Patient presents with   • Follow-up     Pt here to follow up on lab results. Pt had u/s done last time for pelvic pain and left adnexal mass. Pt had DARINEL drawn and pap done last visit. Pt denies any other questions or concerns. Pt had mammogram done 10/24/22 with benign finding, no evidence of cancer at [facility].        History of Present Illness  Yarely Cevallos is a 61 y.o. female , , who comes to the office today for follow-up of results. Patient with left adnexal mass. Experiences intermittent pelvic and abdominal pain. Notes intermittent bloating and early satiety. Also with hematuria. Has an appointment with Urology next week for evaluation. Brother with bladder cancer. .      Review of Systems   Constitutional: Positive for appetite change.   Gastrointestinal: Positive for abdominal distention and abdominal pain.   Genitourinary: Positive for hematuria and pelvic pain. Negative for difficulty urinating, genital sores, menstrual problem, vaginal bleeding and vaginal discharge.   All other systems reviewed and are negative.       Objective    Objective     Vitals:   Visit Vitals  /84   Ht 167.6 cm (66\")   Wt 89.9 kg (198 lb 4.8 oz)   BMI 32.01 kg/m²        Physical Exam  Vitals reviewed.   Constitutional:       General: She is not in acute distress.     Appearance: Normal appearance. She is not ill-appearing.   HENT:      Head: Normocephalic and atraumatic.      Nose: No congestion or rhinorrhea.   Eyes:      General: No scleral icterus.        Right eye: No discharge.         Left eye: No discharge.      Extraocular Movements: Extraocular movements intact.      Conjunctiva/sclera: Conjunctivae normal.   Pulmonary:      Effort: Pulmonary effort is normal. No accessory " muscle usage or respiratory distress.   Musculoskeletal:      Right lower leg: No edema.      Left lower leg: No edema.   Skin:     General: Skin is warm and dry.      Coloration: Skin is not ashen, cyanotic or jaundiced.   Neurological:      General: No focal deficit present.      Mental Status: She is alert and oriented to person, place, and time.   Psychiatric:         Mood and Affect: Mood normal.         Behavior: Behavior is cooperative.           Result Review    HPV DNA Probe, Direct - ThinPrep Vial, Cervix (10/19/2022 11:15): HPV negative  Liquid-based Pap Smear, Screening (10/19/2022 11:15): NILM, ET absent  Postmenopausal Interp: LOW (10/19/2022 10:28)  Premenopausal Interp: LOW (10/19/2022 10:28)  Ovarian Malignancy Risk-DARINEL (10/19/2022 10:28)   = 8.4  Low risk postmenopausal    US Non-ob Transvaginal (10/19/2022 09:33)  Interpretation: The uterus is of normal size and structure, 4.3 cm in length.  The endometrium measures 2.2 mm.  There is a circumscribed lesion in the left adnexa measuring 3.3 cm in width.  It is moderately echogenic, and no normal-appearing ovarian tissue on the left is identified.  The right ovary appears normal without cysts or masses.  There is no free fluid.        Assessment & Plan   Assessment / Plan     Diagnoses and all orders for this visit:    1. Pelvic mass in female (Primary)  -     Ambulatory Referral to Gynecologic Oncology    2. Hematuria, unspecified type        Discussion: Follow-up with urology as planned. Discussed low risk of malignancy based on DARINEL but given ultrasound appearance, cannot rule out neoplasm of the ovary. Discussed that surgery is needed to definitively determine what her left adnexal mass is. Recommended GYN Oncology evaluation as she is symptomatic and based on ultrasound appearance. If GYN Oncology does not believe this to be cancer, I would perform a oophorectomy. We discussed possibilities includin) I perform the oophorectomy and if  cancer, I would refer her to GYN Oncology with the expectation that this could delay her treatment for cancer  2) GYN Oncology referral    Overall, she expressed understanding of the situation. Will send to GYN Oncology for evaluation in [place].     Follow-up: Return in about 3 weeks (around 11/16/2022) for poss preop, GYN f/u.    Leonardo Wood MD

## 2022-11-01 ENCOUNTER — OFFICE VISIT (OUTPATIENT)
Dept: UROLOGY | Facility: CLINIC | Age: 62
End: 2022-11-01

## 2022-11-01 VITALS — TEMPERATURE: 98 F | BODY MASS INDEX: 32.05 KG/M2 | WEIGHT: 199.4 LBS | HEIGHT: 66 IN

## 2022-11-01 DIAGNOSIS — R10.2 SUPRAPUBIC PAIN: Primary | ICD-10-CM

## 2022-11-01 DIAGNOSIS — Z87.442 HISTORY OF KIDNEY STONES: ICD-10-CM

## 2022-11-01 LAB
BILIRUB BLD-MCNC: NEGATIVE MG/DL
CLARITY, POC: CLEAR
COLOR UR: YELLOW
GLUCOSE UR STRIP-MCNC: NEGATIVE MG/DL
KETONES UR QL: NEGATIVE
LEUKOCYTE EST, POC: ABNORMAL
NITRITE UR-MCNC: NEGATIVE MG/ML
PH UR: 5.5 [PH] (ref 5–8)
PROT UR STRIP-MCNC: NEGATIVE MG/DL
RBC # UR STRIP: ABNORMAL /UL
SP GR UR: 1 (ref 1–1.03)
UROBILINOGEN UR QL: ABNORMAL

## 2022-11-01 PROCEDURE — 81001 URINALYSIS AUTO W/SCOPE: CPT | Performed by: PHYSICIAN ASSISTANT

## 2022-11-01 PROCEDURE — 99203 OFFICE O/P NEW LOW 30 MIN: CPT | Performed by: PHYSICIAN ASSISTANT

## 2022-11-01 PROCEDURE — 51798 US URINE CAPACITY MEASURE: CPT | Performed by: PHYSICIAN ASSISTANT

## 2022-11-09 ENCOUNTER — TELEPHONE (OUTPATIENT)
Dept: UROLOGY | Facility: CLINIC | Age: 62
End: 2022-11-09

## 2022-11-09 ENCOUNTER — HOSPITAL ENCOUNTER (OUTPATIENT)
Dept: CT IMAGING | Facility: HOSPITAL | Age: 62
Discharge: HOME | End: 2022-11-09
Admitting: PHYSICIAN ASSISTANT

## 2022-11-09 DIAGNOSIS — Z87.442 HISTORY OF KIDNEY STONES: ICD-10-CM

## 2022-11-09 DIAGNOSIS — R91.1 PULMONARY NODULE, RIGHT: Primary | ICD-10-CM

## 2022-11-09 DIAGNOSIS — R10.2 SUPRAPUBIC PAIN: ICD-10-CM

## 2022-11-09 PROCEDURE — 74178 CT ABD&PLV WO CNTR FLWD CNTR: CPT

## 2022-11-09 PROCEDURE — 0 IOPAMIDOL PER 1 ML: Performed by: PHYSICIAN ASSISTANT

## 2022-11-09 RX ADMIN — IOPAMIDOL 100 ML: 755 INJECTION, SOLUTION INTRAVENOUS at 13:06

## 2022-11-09 NOTE — TELEPHONE ENCOUNTER
I called patient regarding her CT results and there is no urologic abnormalities.  Did show a 3.5 cm likely uterine fibroid patient does have follow-up with a ovarian specialist will be addressing that at this time but she was aware.  Also found was a 6 mm right lower lobe cavitary nodule most likely related to previous infection or inflammatory process I said that the pulmonologist recommends follow-up CT of the chest in 6 months.

## 2022-11-16 ENCOUNTER — APPOINTMENT (OUTPATIENT)
Dept: CT IMAGING | Facility: HOSPITAL | Age: 62
End: 2022-11-16

## 2022-12-05 ENCOUNTER — OFFICE VISIT (OUTPATIENT)
Dept: OBSTETRICS AND GYNECOLOGY | Facility: CLINIC | Age: 62
End: 2022-12-05

## 2022-12-05 VITALS
BODY MASS INDEX: 31.85 KG/M2 | HEIGHT: 66 IN | WEIGHT: 198.2 LBS | DIASTOLIC BLOOD PRESSURE: 84 MMHG | SYSTOLIC BLOOD PRESSURE: 132 MMHG

## 2022-12-05 DIAGNOSIS — K40.90 UNILATERAL INGUINAL HERNIA WITHOUT OBSTRUCTION OR GANGRENE, RECURRENCE NOT SPECIFIED: ICD-10-CM

## 2022-12-05 DIAGNOSIS — N83.202 CYST OF LEFT OVARY: Primary | ICD-10-CM

## 2022-12-05 DIAGNOSIS — K42.9 PERIUMBILICAL HERNIA: ICD-10-CM

## 2022-12-05 DIAGNOSIS — R10.2 PELVIC PAIN: ICD-10-CM

## 2022-12-05 PROCEDURE — 99214 OFFICE O/P EST MOD 30 MIN: CPT | Performed by: OBSTETRICS & GYNECOLOGY

## 2022-12-05 RX ORDER — SODIUM CHLORIDE 9 MG/ML
100 INJECTION, SOLUTION INTRAVENOUS CONTINUOUS
Status: CANCELLED | OUTPATIENT
Start: 2022-12-05

## 2022-12-05 RX ORDER — SODIUM CHLORIDE 9 MG/ML
40 INJECTION, SOLUTION INTRAVENOUS AS NEEDED
Status: CANCELLED | OUTPATIENT
Start: 2022-12-05

## 2022-12-05 RX ORDER — SODIUM CHLORIDE 0.9 % (FLUSH) 0.9 %
10 SYRINGE (ML) INJECTION EVERY 12 HOURS SCHEDULED
Status: CANCELLED | OUTPATIENT
Start: 2022-12-05

## 2022-12-05 RX ORDER — ATOGEPANT 30 MG/1
30 TABLET ORAL DAILY
COMMUNITY

## 2022-12-05 RX ORDER — SODIUM CHLORIDE 0.9 % (FLUSH) 0.9 %
1-10 SYRINGE (ML) INJECTION AS NEEDED
Status: CANCELLED | OUTPATIENT
Start: 2022-12-05

## 2022-12-05 NOTE — PROGRESS NOTES
"    Leonardo Wood MD  [facility]  [address]  [place] [address]  Office [phone]  Fax [phone]      [facility]  Yarely Cevallos  : 1960  MRN: [id number]    Subjective   Subjective     Chief Complaint   Patient presents with   • Follow-up     Patient here for follow up after gyn onc appointment. Possible pre-op for patient. Patient denies questions or concerns today.        History of Present Illness  Yarely Cevallos is a 61 y.o. female , , who comes to the office today for preop visit.  She saw gynecology oncology for consult secondary to pelvic mass.  Following their consult, they recommended that this is likely low suspicion for ovarian malignancy.  She presents today for preoperative planning for definitive management.  She has pelvic pain has been intermittent but gradually worsening over the past several months.  Pain occasionally occurs in the right midline and left lower quadrants.  She has no other complaints at this time.  She denies any vaginal bleeding.     Review of Systems   Gastrointestinal: Positive for abdominal pain.   Genitourinary: Positive for pelvic pain. Negative for decreased urine volume, difficulty urinating, dyspareunia, dysuria, enuresis, flank pain, frequency, genital sores, hematuria, menstrual problem, urgency, vaginal bleeding, vaginal discharge and vaginal pain.   All other systems reviewed and are negative.       The following portions of the patient's history were reviewed and updated as appropriate: allergies, current medications, past family history, past medical history, past social history, past surgical history and problem list.    Objective    Objective     Vitals:   Visit Vitals  /84   Ht 167.6 cm (66\")   Wt 89.9 kg (198 lb 3.2 oz)   BMI 31.99 kg/m²        Physical Exam  Vitals reviewed.   Constitutional:       General: She is not in acute distress.     Appearance: Normal appearance. She is not ill-appearing.   HENT:      Head: " Normocephalic and atraumatic.      Nose: No congestion or rhinorrhea.   Eyes:      General: No scleral icterus.        Right eye: No discharge.         Left eye: No discharge.      Extraocular Movements: Extraocular movements intact.      Conjunctiva/sclera: Conjunctivae normal.   Pulmonary:      Effort: Pulmonary effort is normal. No accessory muscle usage or respiratory distress.   Musculoskeletal:      Right lower leg: No edema.      Left lower leg: No edema.   Skin:     General: Skin is warm and dry.      Coloration: Skin is not ashen, cyanotic or jaundiced.   Neurological:      General: No focal deficit present.      Mental Status: She is alert and oriented to person, place, and time.   Psychiatric:         Mood and Affect: Mood normal.         Behavior: Behavior is cooperative.       Result Review    CT Abdomen Pelvis With & Without Contrast (11/09/2022 13:24)  1.  No urinary tract calculi or hydronephrosis. No solid renal mass or  evidence of urothelial lesion.  2.  6 mm cavitary pulmonary nodule in the RIGHT lower lobe. Suspect this  is related to an infectious or inflammatory process but recommend a  follow-up chest CT in 6 months to confirm stability or resolution.  3.  3.5 cm mass projecting from the LEFT posterior body, likely  representing a subserosal fibroid.    US Non-ob Transvaginal (10/19/2022 09:33)  Interpretation: The uterus is of normal size and structure, 4.3 cm in length.  The endometrium measures 2.2 mm.  There is a circumscribed lesion in the left adnexa measuring 3.3 cm in width.  It is moderately echogenic, and no normal-appearing ovarian tissue on the left is identified.  The right ovary appears normal without cysts or masses.  There is no free fluid.    Liquid-based Pap Smear, Screening (10/19/2022 11:15)  HPV DNA Probe, Direct - ThinPrep Vial, Cervix (10/19/2022 11:15)  NILM, HPV negative    Postmenopausal Interp: LOW (10/19/2022 10:28)  Premenopausal Interp: LOW (10/19/2022  10:28)  Ovarian Malignancy Risk-DARINEL (10/19/2022 10:28)   = 8          Assessment & Plan   Assessment / Plan     Diagnoses and all orders for this visit:    1. Cyst of left ovary (Primary)  -     Case Request; Standing  -     CBC (No Diff); Future  -     Basic Metabolic Panel; Future  -     Type & Screen; Future  -     sodium chloride 0.9 % infusion  -     ceFAZolin (ANCEF) 2 g in sodium chloride 0.9 % 100 mL IVPB  -     sodium chloride 0.9 % flush 10 mL  -     sodium chloride 0.9 % flush 1-10 mL  -     sodium chloride 0.9 % infusion 40 mL  -     Case Request    2. Pelvic pain  -     Case Request; Standing  -     CBC (No Diff); Future  -     Basic Metabolic Panel; Future  -     Type & Screen; Future  -     sodium chloride 0.9 % infusion  -     ceFAZolin (ANCEF) 2 g in sodium chloride 0.9 % 100 mL IVPB  -     sodium chloride 0.9 % flush 10 mL  -     sodium chloride 0.9 % flush 1-10 mL  -     sodium chloride 0.9 % infusion 40 mL  -     Case Request    3. Unilateral inguinal hernia without obstruction or gangrene, recurrence not specified  -     Ambulatory Referral to General Surgery    4. Periumbilical hernia  -     Ambulatory Referral to General Surgery    Other orders  -     Follow Anesthesia Guidelines / Protocol; Future  -     Obtain Informed Consent; Future  -     Provide NPO Instructions to Patient; Future  -     Chlorhexidine Skin Prep; Future  -     Follow Anesthesia Guidelines / Protocol; Standing  -     SCD (Sequential Compression Device) - Place on Patient in Pre-Op; Standing  -     Clip Operative Site; Standing  -     Verify / Perform Chlorhexidine Skin Prep; Standing  -     Verify / Perform Chlorhexidine Skin Prep if Indicated (If Not Already Completed); Standing  -     Type & Screen; Standing  -     Insert Peripheral IV; Standing  -     Saline Lock & Maintain IV Access; Standing        Discussion:  Ultrasound and CT scans reviewed with the patient.  Gynecology oncology note reviewed with the  patient.  Gynecology oncology suspects low risk of malignancy for this ovarian cyst/mass -okay to proceed with oophorectomy here in [place].  Discussed her options including serial surveillance versus surgery.  Patient is interested in definitive management over surveillance for her left ovarian cyst and pelvic pain.  As such, since she is with pelvic pain, can consider hysterectomy as well.  I discussed with her that hysterectomy and oophorectomy may not improve her pelvic pain however that this is a possibility as she could also have a degenerating fibroid based on CT scan.  CT scan notes small left inguinal hernia that contains fat as well as a periumbilical fat-containing hernia.  Discussed with patient that I would recommend general surgery consultation for surgical repair of these.  Can consider joint case with them.  Overall, discussed with patient a minimally invasive approach including the robot-assisted total laparoscopic hysterectomy with bilateral salpingo-oophorectomy with cystoscopy.  Risks of surgery as well as postoperative recovery reviewed with the patient.  Her questions were answered to her satisfaction.  She expressed understanding of the above and wished to proceed.  She is tentatively scheduled for robot-assisted hysterectomy with BSO/cystoscopy on 12/16/2022.    Surgical Counseling  The patient was informed of the risks and benefits of the planned procedures. The risks included but were not limited to: bleeding, infection, injury to surrounding structures potentially including the vascular, gastrointestinal, and genitourinary systems, nerve injury, possible blood transfusion and its associated risks. Patient was counseled on the possibility of a laparotomy, and all other indicated procedures. We discussed the possibility of difficulties with anesthesia, potential exacerbations to other health conditions, and potential concern for chronic pain that follows any surgery. Patient expressed  understanding of the risks involved. Her questions were answered to her satisfaction. No guarantees were made or implied. The patient consented to proceed with the planned procedures.      Follow-up: Return in about 2 weeks (around 12/19/2022) for postop.    Leonardo Wood MD

## 2022-12-07 ENCOUNTER — OFFICE VISIT (OUTPATIENT)
Dept: SURGERY | Facility: CLINIC | Age: 62
End: 2022-12-07

## 2022-12-07 VITALS
BODY MASS INDEX: 31.85 KG/M2 | HEIGHT: 66 IN | HEART RATE: 80 BPM | DIASTOLIC BLOOD PRESSURE: 70 MMHG | WEIGHT: 198.19 LBS | SYSTOLIC BLOOD PRESSURE: 140 MMHG

## 2022-12-07 DIAGNOSIS — K42.9 UMBILICAL HERNIA WITHOUT OBSTRUCTION AND WITHOUT GANGRENE: ICD-10-CM

## 2022-12-07 DIAGNOSIS — R10.13 EPIGASTRIC PAIN: ICD-10-CM

## 2022-12-07 DIAGNOSIS — K40.90 NON-RECURRENT UNILATERAL INGUINAL HERNIA WITHOUT OBSTRUCTION OR GANGRENE: Primary | ICD-10-CM

## 2022-12-07 PROCEDURE — 99204 OFFICE O/P NEW MOD 45 MIN: CPT | Performed by: STUDENT IN AN ORGANIZED HEALTH CARE EDUCATION/TRAINING PROGRAM

## 2022-12-07 NOTE — PROGRESS NOTES
Office New Patient History and Physical:     Referring Provider: No ref. provider found    Chief Complaint   Patient presents with   • Hernia       Subjective .     History of present illness:  Yarely Cevallos is a 61 y.o. female with an inguinal and periumbilica hernia. She reports pain in her lower abdomen; she does not know if this is related to the cyst on her uterus or the hernias. She denies a bulge in her left groin or at her umbilicus. She denies nausea and vomiting.  Epigastric pain under her costal margin. She has increased abdominal pain and nausea with fatty foods.     She has never had surgery on her abdomen. BMI is 32. She is not on blood thinners. She is a non-smoker.     History  Past Medical History:   Diagnosis Date   • GERD (gastroesophageal reflux disease)    • Migraines    • PONV (postoperative nausea and vomiting)    ,   Past Surgical History:   Procedure Laterality Date   • KIDNEY STONE SURGERY     • PLANTAR FASCIA RELEASE Left 5/24/2022    Procedure: ENDOSCOPIC PLANTAR FASCIAL RELEASE, LEFT FOOT;  Surgeon: Timothy Lin DPM;  Location: [facility];  Service: Podiatry;  Laterality: Left;   • TONSILLECTOMY     ,   Family History   Problem Relation Age of Onset   • Cancer Brother         bladder cancer   ,   Social History     Tobacco Use   • Smoking status: Never   • Smokeless tobacco: Never   Vaping Use   • Vaping Use: Never used   Substance Use Topics   • Alcohol use: Never   • Drug use: Never   , (Not in a hospital admission)   and Allergies:  Codeine, Penicillins, and Sulfa antibiotics    Current Outpatient Medications:   •  Atogepant (Qulipta) 30 MG tablet, Qulipta 30 mg tablet, Disp: , Rfl:   •  diazePAM (VALIUM) 5 MG tablet, diazepam 5 mg tablet  TAKE 1 TABLET BY MOUTH ONCE DAILY AT BEDTIME FOR 10 DAYS, Disp: , Rfl:   •  diphenhydrAMINE (BENADRYL) 25 mg capsule, Take 1 capsule by mouth Every 6 (Six) Hours As Needed for Itching., Disp: , Rfl:   •  docusate sodium (COLACE) 100 MG capsule,  "Take 1 capsule by mouth 2 (Two) Times a Day., Disp: 60 capsule, Rfl: 0  •  ondansetron (Zofran) 4 MG tablet, Take 1 tablet by mouth Every 4 (Four) Hours., Disp: 42 tablet, Rfl: 0  •  pantoprazole (PROTONIX) 40 MG EC tablet, pantoprazole 40 mg tablet,delayed release, Disp: , Rfl:   •  promethazine (PHENERGAN) 25 MG tablet, , Disp: , Rfl:   •  rizatriptan (MAXALT) 10 MG tablet, Take 10 mg by mouth 1 (One) Time As Needed for Migraine. May repeat in 2 hours if needed, Disp: , Rfl:     Objective     Vital Signs   /70 (BP Location: Left arm, Patient Position: Sitting, Cuff Size: Adult)   Pulse 80   Ht 167.6 cm (65.98\")   Wt 89.9 kg (198 lb 3.1 oz)   BMI 32.00 kg/m²      Physical Exam:  General appearance - alert, well appearing, and in no distress  Mental status - alert, oriented to person, place, and time  Eyes - pupils equal and reactive, extraocular eye movements intact  Neck - supple, no significant adenopathy  Chest - no tachypnea, retractions or cyanosis  Heart - normal rate and regular rhythm  Abdomen - soft, nontender, nondistended, no masses or organomegaly  Small umbilical hernia, left inguinal hernia repair   Neurological - alert, oriented, normal speech, no focal findings or movement disorder noted  Musculoskeletal - no joint tenderness, deformity or swelling    Results Review:    The following data was reviewed by: Kisha Dykes MD on 12/07/2022:  CT Abdomen Pelvis With & Without Contrast (11/09/2022 13:24)  1.  No urinary tract calculi or hydronephrosis. No solid renal mass or  evidence of urothelial lesion.  2.  6 mm cavitary pulmonary nodule in the RIGHT lower lobe. Suspect this is related to an infectious or inflammatory process but recommend a follow-up chest CT in 6 months to confirm stability or resolution.  3.  3.5 cm mass projecting from the LEFT posterior body, likely  representing a subserosal fibroid.   Progress Notes by Leonardo Wood MD (12/05/2022 13:00)    Assessment & Plan "       Diagnoses and all orders for this visit:    1. Non-recurrent unilateral inguinal hernia without obstruction or gangrene (Primary)  -     US Gallbladder; Future  -     Case Request; Standing  -     MRSA Screen Culture (Outpatient) - Swab, Nares; Future  -     ceFAZolin (ANCEF) 2 g in sodium chloride 0.9 % 100 mL IVPB  -     Case Request    2. Umbilical hernia without obstruction and without gangrene  -     Case Request; Standing  -     MRSA Screen Culture (Outpatient) - Swab, Nares; Future  -     ceFAZolin (ANCEF) 2 g in sodium chloride 0.9 % 100 mL IVPB  -     Case Request    3. Epigastric pain    Other orders  -     Follow Anesthesia Guidelines / Protocol; Future  -     Obtain Informed Consent; Future  -     Provide NPO Instructions to Patient; Future  -     Chlorhexidine Skin Prep; Future  -     Follow Anesthesia Guidelines / Protocol; Standing  -     Verify / Perform Chlorhexidine Skin Prep; Standing  -     Verify / Perform Chlorhexidine Skin Prep if Indicated (If Not Already Completed); Standing  -     Instructions on coughing, deep breathing, and incentive spirometry.; Standing  -     Oxygen Therapy-; Standing  -     Notify physician (specify); Standing         Yarely Cevallos is a 61 y.o. female with a left sided inguinal hernia as well as an umbilical hernia without obstruction or gangrene. She is scheduled for a robotic hysterectomy on 12/16 and I personally spoke to Dr. Wood to plan for a combo case so she can have her hernias repaired at the same time.  Risks of surgery including bleeding/hematoma, infection, mesh infection requiring removal, damage to surrounding structures (including the arteries, veins and nerves) with potential chronic post-operative pain, and hernia recurrence have been discussed with the patient. I also discussed the different operative approaches available for inguinal and umbilical hernia repair and their respective risks and benefits including open repair, laparoscopic  repair, and robotic repair. After this discussion, the patient and I have decided to proceed with laparoscopic robotic assisted left sided inguinal hernia repair with mesh and umbilical hernia repair due to factors including we will do it concurrently with her robotic hysterectomy. The patient has an appointment for pre-operative CBC, CMP, EKG, CXR.  The patient is currently scheduled for the above procedure on 12/16/22 with Dr. Wood.     She is at increased risk of perioperative complications 2/2 to her elevated BMI.     For her post prandial epigastric pain, I have recommended a gallbladder US. I explained that we will not do this at the time of her hernia repair as cholecystectomy is not a clean case and we will use mesh for hernia. She will get the gallbladder US after she is recovered from her hysterectomy.     I also discussed with the patient post operative pain management including multimodal pain control utilizing Tylenol, ibuprofen, and Roxicodone for breakthrough pain. I will plan to give the patient 15 tabs of 5mg Roxicodone post operatively for breakthrough pain.    BMI is >= 30 and <35. (Class 1 Obesity). The following options were offered after discussion;: weight loss educational material (shared in after visit summary)      Kisha Dykes MD  12/07/22  11:48 CST

## 2022-12-08 NOTE — PATIENT INSTRUCTIONS

## 2022-12-09 ENCOUNTER — PRE-ADMISSION TESTING (OUTPATIENT)
Dept: PREADMISSION TESTING | Facility: HOSPITAL | Age: 62
End: 2022-12-09

## 2022-12-09 VITALS
BODY MASS INDEX: 31.89 KG/M2 | RESPIRATION RATE: 16 BRPM | WEIGHT: 198.41 LBS | SYSTOLIC BLOOD PRESSURE: 141 MMHG | OXYGEN SATURATION: 100 % | DIASTOLIC BLOOD PRESSURE: 78 MMHG | HEART RATE: 66 BPM | HEIGHT: 66 IN

## 2022-12-09 DIAGNOSIS — K40.90 NON-RECURRENT UNILATERAL INGUINAL HERNIA WITHOUT OBSTRUCTION OR GANGRENE: ICD-10-CM

## 2022-12-09 DIAGNOSIS — K42.9 UMBILICAL HERNIA WITHOUT OBSTRUCTION AND WITHOUT GANGRENE: ICD-10-CM

## 2022-12-09 PROCEDURE — 85027 COMPLETE CBC AUTOMATED: CPT | Performed by: OBSTETRICS & GYNECOLOGY

## 2022-12-09 PROCEDURE — 80048 BASIC METABOLIC PNL TOTAL CA: CPT | Performed by: OBSTETRICS & GYNECOLOGY

## 2022-12-09 PROCEDURE — 87081 CULTURE SCREEN ONLY: CPT

## 2022-12-09 NOTE — DISCHARGE INSTRUCTIONS
Before you come to the hospital        Arrival time: AS DIRECTED BY OFFICE              ALL OTHER HOME MEDICATION CHECK WITH YOUR PHYSICIAN (especially if   you are taking diabetes medicines or blood thinners)      If you were given and instructed to use a germ- killing soap, use as directed the night before surgery and again the morning of surgery or as directed by your surgeon. (Use one-half of the bottle with each shower.)   See attached information for How to Use Chlorhexidine for Bathing if applicable.            Eating and drinking restrictions prior to scheduled arrival time    2 Hours before arrival time STOP   Drinking Clear liquids (water, apple juice-no pulp)     6 Hours before arrival time STOP   Milk or drinks that contain milk, full liquids    6 Hours before arrival time STOP   Light meals or foods, such as toast or cereal    8 Hours before arrival time STOP   Heavy foods, such as meat, fried foods, or fatty foods    (It is extremely important that you follow these guidelines to prevent delay or cancelation of your procedure)     Clear Liquids  Water and flavored water                                                                      Clear Fruit juices, such as cranberry juice and apple juice.  Black coffee (NO cream of any kind, including powdered).  Plain tea  Clear bouillon or broth.  Flavored gelatin.  Soda.  Gatorade or Powerade.  Full liquid examples  Juices that have pulp.  Frozen ice pops that contain fruit pieces.  Coffee with creamer  Milk.  Yogurt.                MANAGING PAIN AFTER SURGERY    We know you are probably wondering what your pain will be like after surgery.  Following surgery it is unrealistic to expect you will not have pain.   Pain is how our bodies let us know that something is wrong or cautions us to be careful.  That said, our goal is to make your pain tolerable.    Methods we may use to treat your pain include (oral or IV medications, PCAs, epidurals, nerve blocks,  etc.)   While some procedures require IV pain medications for a short time after surgery, transitioning to pain medications by mouth allows for better management of pain.   Your nurse will encourage you to take oral pain medications whenever possible.  IV medications work almost immediately, but only last a short while.  Taking medications by mouth allows for a more constant level of medication in your blood stream for a longer period of time.      Once your pain is out of control it is harder to get back under control.  It is important you are aware when your next dose of pain medication is due.  If you are admitted, your nurse may write the time of your next dose on the white board in your room to help you remember.      We are interested in your pain and encourage you to inform us about aggravating factors during your visit.   Many times a simple repositioning every few hours can make a big difference.    If your physician says it is okay, do not let your pain prevent you from getting out of bed. Be sure to call your nurse for assistance prior to getting up so you do not fall.      Before surgery, please decide your tolerable pain goal.  These faces help describe the pain ratings we use on a 0-10 scale.   Be prepared to tell us your goal and whether or not you take pain or anxiety medications at home.          Preparing for Surgery  Preparing for surgery is an important part of your care. It can make things go more smoothly and help you avoid complications. The steps leading up to surgery may vary among hospitals. Follow all instructions given to you by your health care providers. Ask questions if you do not understand something. Talk about any concerns that you have.  Here are some questions to consider asking before your surgery:  If my surgery is not an emergency (is elective), when would be the best time to have the surgery?  What arrangements do I need to make for work, home, or school?  What will my  recovery be like? How long will it be before I can return to normal activities?  Will I need to prepare my home? Will I need to arrange care for me or my children?  Should I expect to have pain after surgery? What are my pain management options? Are there nonmedical options that I can try for pain?  Tell a health care provider about:  Any allergies you have.  All medicines you are taking, including vitamins, herbs, eye drops, creams, and over-the-counter medicines.  Any problems you or family members have had with anesthetic medicines.  Any blood disorders you have.  Any surgeries you have had.  Any medical conditions you have.  Whether you are pregnant or may be pregnant.  What are the risks?  The risks and complications of surgery depend on the specific procedure that you have. Discuss all the risks with your health care providers before your surgery. Ask about common surgical complications, which may include:  Infection.  Bleeding or a need for blood replacement (transfusion).  Allergic reactions to medicines.  Damage to surrounding nerves, tissues, or structures.  A blood clot.  Scarring.  Failure of the surgery to correct the problem.  Follow these instructions before the procedure:  Several days or weeks before your procedure  You may have a physical exam by your primary health care provider to make sure it is safe for you to have surgery.  You may have testing. This may include a chest X-ray, blood and urine tests, electrocardiogram (ECG), or other testing.  Ask your health care provider about:  Changing or stopping your regular medicines. This is especially important if you are taking diabetes medicines or blood thinners.  Taking medicines such as aspirin and ibuprofen. These medicines can thin your blood. Do not take these medicines unless your health care provider tells you to take them.  Taking over-the-counter medicines, vitamins, herbs, and supplements.  Do not use any products that contain nicotine or  tobacco, such as cigarettes and e-cigarettes. If you need help quitting, ask your health care provider.  Avoid alcohol.  Ask your health care provider if there are exercises you can do to prepare for surgery.  Eat a healthy diet.   Plan to have someone take you home from the hospital or clinic.  Plan to have a responsible adult care for you for at least 24 hours after you leave the hospital or clinic. This is important.  The day before your procedure  You may be given antibiotic medicine to take by mouth to help prevent infection. Take it as told by your health care provider.  You may be asked to shower with a germ-killing soap.  Follow instructions from your health care provider about eating and drinking restrictions. This includes gum, mints and hard candy.  Pack comfortable clothes according to your procedure.   The day of your procedure  You may need to take another shower with a germ-killing soap before you leave home in the morning.  With a small sip of water, take only the medicines that you are told to take.  Remove all jewelry including rings.   Leave anything you consider valuable at home except hearing aids if needed.  You do not need to bring your home medications into the hospital.   Do not wear any makeup, nail polish, powder, deodorant, lotion, hair accessories, or anything on your skin or body except your clothes.  If you will be staying in the hospital, bring a case to hold your glasses, contacts, or dentures. You may also want to bring your robe and non-skid footwear.       (Do not use denture adhesives since you will be asked to remove them during  surgery).   If you wear oxygen at home, bring it with you the day of surgery.  If instructed by your health care provider, bring your sleep apnea device with you on the day of your surgery (if this applies to you).  You may want to leave your suitcase and sleep apnea device in the car until after surgery.   Arrive at the hospital as scheduled.  Bring a  friend or family member with you who can help to answer questions and be present while you meet with your health care provider.  At the hospital  When you arrive at the hospital:  Go to registration located at the main entrance of the hospital. You will be registered and given a beeper and a sticker sheet. Take the stickers to the Outpatient nurses desk and place in the black tray. This is to notify staff that you have arrived. Then return to the lobby to wait.   When your beeper lights up and vibrates proceed through the double doors, under the stairs, and a member of the Outpatient Surgery staff will escort you to your preoperative room.  You may have to wear compression sleeves. These help to prevent blood clots and reduce swelling in your legs.  An IV may be inserted into one of your veins.              In the operating room, you may be given one or more of the following:        A medicine to help you relax (sedative).        A medicine to numb the area (local anesthetic).        A medicine to make you fall asleep (general anesthetic).        A medicine that is injected into an area of your body to numb everything below the                      injection site (regional anesthetic).  You may be given an antibiotic through your IV to help prevent infection.  Your surgical site will be marked or identified.    Contact a health care provider if you:  Develop a fever of more than 100.4°F (38°C) or other feelings of illness during the 48 hours before your surgery.  Have symptoms that get worse.  Have questions or concerns about your surgery.  Summary  Preparing for surgery can make the procedure go more smoothly and lower your risk of complications.  Before surgery, make a list of questions and concerns to discuss with your surgeon. Ask about the risks and possible complications.  In the days or weeks before your surgery, follow all instructions from your health care provider. You may need to stop smoking, avoid  alcohol, follow eating restrictions, and change or stop your regular medicines.  Contact your surgeon if you develop a fever or other signs of illness during the few days before your surgery.  This information is not intended to replace advice given to you by your health care provider. Make sure you discuss any questions you have with your health care provider.  Document Revised: 12/21/2018 Document Reviewed: 10/23/2018  [employer] Patient Education © 2021 [employer]

## 2022-12-10 LAB — MRSA SPEC QL CULT: NORMAL

## 2022-12-13 ENCOUNTER — HOSPITAL ENCOUNTER (OUTPATIENT)
Dept: ULTRASOUND IMAGING | Facility: HOSPITAL | Age: 62
Discharge: HOME | End: 2022-12-13
Admitting: STUDENT IN AN ORGANIZED HEALTH CARE EDUCATION/TRAINING PROGRAM

## 2022-12-13 DIAGNOSIS — K40.90 NON-RECURRENT UNILATERAL INGUINAL HERNIA WITHOUT OBSTRUCTION OR GANGRENE: ICD-10-CM

## 2022-12-13 PROCEDURE — 76705 ECHO EXAM OF ABDOMEN: CPT

## 2022-12-15 PROCEDURE — S0260 H&P FOR SURGERY: HCPCS | Performed by: OBSTETRICS & GYNECOLOGY

## 2022-12-15 NOTE — H&P
[facility]   PREOPERATIVE HISTORY AND PHYSICAL    Patient Name:Yarely Cevallos  : 1960  MRN: [id number]  Primary Care Physician: Vladislav Briggs MD  Date of admission: (Not on file)    Subjective   Subjective     Chief Complaint: preoperative evaluation    History of Present Illness  Yarely Cevallos is a 61 y.o. female who presents for preoperative evaluation. She is scheduled for TOTAL LAPAROSCOPIC HYSTERECTOMY BILATERAL SALPINGOOPHORECTOMY WITH DAVINCI ROBOT, CYSTO (N/A), LEFT INGUINAL HERNIA REPAIR LAPAROSCOPIC WITH DAVINCI ROBOT WITH PRIMARY UMBILICAL HERNIA REPAIR - combo case with Dr. Wood (Left), PRIMARY UMBILICAL HERNIA REPAIR - combo case with Dr. Wood (N/A)     She saw gynecology oncology for consult secondary to pelvic mass.  Following their consult, they recommended that this is likely low suspicion for ovarian malignancy.  She presents today for preoperative planning for definitive management.  She has pelvic pain has been intermittent but gradually worsening over the past several months.  Pain occasionally occurs in the right midline and left lower quadrants.  She has no other complaints at this time.  She denies any vaginal bleeding.     Review of Systems   Gastrointestinal: Positive for abdominal pain.   Genitourinary: Positive for pelvic pain. Negative for decreased urine volume, difficulty urinating, dyspareunia, dysuria, enuresis, flank pain, frequency, genital sores, hematuria, menstrual problem, urgency, vaginal bleeding, vaginal discharge and vaginal pain.   All other systems reviewed and are negative.    Personal History     Past Medical History:   Diagnosis Date   • GERD (gastroesophageal reflux disease)    • Kidney stone        • Migraines    • PONV (postoperative nausea and vomiting)        Past Surgical History:   Procedure Laterality Date   • KIDNEY STONE SURGERY     • PLANTAR FASCIA RELEASE Left 2022    Procedure: ENDOSCOPIC PLANTAR FASCIAL RELEASE, LEFT FOOT;   "Surgeon: Timothy Lin DPM;  Location: [facility];  Service: Podiatry;  Laterality: Left;   • TONSILLECTOMY         Family History: Her family history includes Cancer in her brother.     Social History: She  reports that she has never smoked. She has never used smokeless tobacco. She reports that she does not drink alcohol and does not use drugs.    Home Medications:  Atogepant, diazePAM, diphenhydrAMINE, docusate sodium, pantoprazole, promethazine, and rizatriptan    Allergies:  She is allergic to codeine, penicillins, and sulfa antibiotics.    Objective    Objective     Vitals:        /78   Ht 167.6 cm (66\")   Wt 89.9 kg (198 lb 3.2 oz)   BMI 31.99 kg/m²         Physical Exam  Vitals reviewed. Exam conducted with a chaperone present.   Constitutional:       General: She is not in acute distress.     Appearance: Normal appearance. She is obese. She is not ill-appearing.   HENT:      Head: Normocephalic and atraumatic.      Nose: No congestion or rhinorrhea.   Eyes:      General: No scleral icterus.        Right eye: No discharge.         Left eye: No discharge.      Extraocular Movements: Extraocular movements intact.      Conjunctiva/sclera: Conjunctivae normal.   Pulmonary:      Effort: Pulmonary effort is normal. No accessory muscle usage or respiratory distress.   Genitourinary:     General: Normal vulva.      Exam position: Lithotomy position.      Pubic Area: No rash or pubic lice.       Labia:         Right: No rash, tenderness or lesion.         Left: No rash, tenderness or lesion.       Urethra: No prolapse or urethral lesion.      Vagina: Normal.      Cervix: Normal.      Uterus: Normal.       Adnexa: Right adnexa normal.        Left: Mass, tenderness and fullness present.       Rectum: No external hemorrhoid.          Comments: Consent for exam obtained verbally from patient.  Musculoskeletal:      Right lower leg: No edema.      Left lower leg: No edema.   Skin:     General: Skin is warm and " dry.      Coloration: Skin is not ashen, cyanotic or jaundiced.   Neurological:      General: No focal deficit present.      Mental Status: She is alert and oriented to person, place, and time.   Psychiatric:         Mood and Affect: Mood normal.         Behavior: Behavior is cooperative.       Result Review   CT Abdomen Pelvis With & Without Contrast (11/09/2022 13:24)  1.  No urinary tract calculi or hydronephrosis. No solid renal mass or  evidence of urothelial lesion.  2.  6 mm cavitary pulmonary nodule in the RIGHT lower lobe. Suspect this  is related to an infectious or inflammatory process but recommend a  follow-up chest CT in 6 months to confirm stability or resolution.  3.  3.5 cm mass projecting from the LEFT posterior body, likely  representing a subserosal fibroid.     US Non-ob Transvaginal (10/19/2022 09:33)  Interpretation: The uterus is of normal size and structure, 4.3 cm in length.  The endometrium measures 2.2 mm.  There is a circumscribed lesion in the left adnexa measuring 3.3 cm in width.  It is moderately echogenic, and no normal-appearing ovarian tissue on the left is identified.  The right ovary appears normal without cysts or masses.  There is no free fluid.     Liquid-based Pap Smear, Screening (10/19/2022 11:15)  HPV DNA Probe, Direct - ThinPrep Vial, Cervix (10/19/2022 11:15)  NILM, HPV negative     Postmenopausal Interp: LOW (10/19/2022 10:28)  Premenopausal Interp: LOW (10/19/2022 10:28)  Ovarian Malignancy Risk-DARINEL (10/19/2022 10:28)   = 8      Assessment & Plan   Assessment / Plan     Brief Patient Summary:  Yarely Cevallos is a 61 y.o. female who presents for preoperative evaluation.    Pre-Op Diagnosis Codes:     * Cyst of left ovary [N83.202]     * Pelvic pain [R10.2]    Active Hospital Problems:  Active Hospital Problems    Diagnosis    • Cyst of left ovary    • Pelvic pain    • Non-recurrent unilateral inguinal hernia without obstruction or gangrene    • Umbilical hernia  without obstruction and without gangrene      Plan:   Procedure(s):  TOTAL LAPAROSCOPIC HYSTERECTOMY BILATERAL SALPINGOOPHORECTOMY WITH DAVINCI ROBOT, CYSTO  LEFT INGUINAL HERNIA REPAIR LAPAROSCOPIC WITH DAVINCI ROBOT WITH PRIMARY UMBILICAL HERNIA REPAIR - combo case with Dr. Wood  PRIMARY UMBILICAL HERNIA REPAIR - combo case with Dr. Wood    Discussion:  Ultrasound and CT scans reviewed with the patient.  Gynecology oncology note reviewed with the patient.  Gynecology oncology suspects low risk of malignancy for this ovarian cyst/mass -okay to proceed with oophorectomy here in [place].  Discussed her options including serial surveillance versus surgery.  Patient is interested in definitive management over surveillance for her left ovarian cyst and pelvic pain.  As such, since she is with pelvic pain, can consider hysterectomy as well.  I discussed with her that hysterectomy and oophorectomy may not improve her pelvic pain however that this is a possibility as she could also have a degenerating fibroid based on CT scan.  CT scan notes small left inguinal hernia that contains fat as well as a periumbilical fat-containing hernia.  Discussed with patient that I would recommend general surgery consultation for surgical repair of these.  Can consider joint case with them.  Overall, discussed with patient a minimally invasive approach including the robot-assisted total laparoscopic hysterectomy with bilateral salpingo-oophorectomy with cystoscopy.  Risks of surgery as well as postoperative recovery reviewed with the patient.  Her questions were answered to her satisfaction.  She expressed understanding of the above and wished to proceed.      Surgical Counseling  The patient was informed of the risks and benefits of the planned procedures. The risks included but were not limited to: bleeding, infection, injury to surrounding structures potentially including the vascular, gastrointestinal, and genitourinary systems,  nerve injury, possible blood transfusion and its associated risks. Patient was counseled on the possibility of a laparotomy, and all other indicated procedures. We discussed the possibility of difficulties with anesthesia, potential exacerbations to other health conditions, and potential concern for chronic pain that follows any surgery. Patient expressed understanding of the risks involved. Her questions were answered to her satisfaction. No guarantees were made or implied. The patient consented to proceed with the planned procedures.    Leonardo Wood MD

## 2022-12-16 ENCOUNTER — ANESTHESIA (OUTPATIENT)
Dept: PERIOP | Facility: HOSPITAL | Age: 62
End: 2022-12-16

## 2022-12-16 ENCOUNTER — HOSPITAL ENCOUNTER (OUTPATIENT)
Facility: HOSPITAL | Age: 62
Setting detail: HOSPITAL OUTPATIENT SURGERY
Discharge: HOME | End: 2022-12-16
Attending: OBSTETRICS & GYNECOLOGY | Admitting: OBSTETRICS & GYNECOLOGY

## 2022-12-16 ENCOUNTER — ANESTHESIA EVENT (OUTPATIENT)
Dept: PERIOP | Facility: HOSPITAL | Age: 62
End: 2022-12-16

## 2022-12-16 VITALS
RESPIRATION RATE: 16 BRPM | TEMPERATURE: 97.1 F | DIASTOLIC BLOOD PRESSURE: 78 MMHG | SYSTOLIC BLOOD PRESSURE: 145 MMHG | HEART RATE: 68 BPM | OXYGEN SATURATION: 95 %

## 2022-12-16 DIAGNOSIS — N83.202 CYST OF LEFT OVARY: ICD-10-CM

## 2022-12-16 DIAGNOSIS — D25.9 CERVICAL LEIOMYOMA: Primary | ICD-10-CM

## 2022-12-16 DIAGNOSIS — R10.2 PELVIC PAIN: ICD-10-CM

## 2022-12-16 LAB
ABO GROUP BLD: NORMAL
BLD GP AB SCN SERPL QL: NEGATIVE
RH BLD: POSITIVE
T&S EXPIRATION DATE: NORMAL

## 2022-12-16 PROCEDURE — 25010000002 DEXAMETHASONE PER 1 MG: Performed by: OBSTETRICS & GYNECOLOGY

## 2022-12-16 PROCEDURE — 25010000002 KETOROLAC TROMETHAMINE PER 15 MG: Performed by: NURSE ANESTHETIST, CERTIFIED REGISTERED

## 2022-12-16 PROCEDURE — 25010000002 PROPOFOL 10 MG/ML EMULSION: Performed by: NURSE ANESTHETIST, CERTIFIED REGISTERED

## 2022-12-16 PROCEDURE — 49650 LAP ING HERNIA REPAIR INIT: CPT | Performed by: STUDENT IN AN ORGANIZED HEALTH CARE EDUCATION/TRAINING PROGRAM

## 2022-12-16 PROCEDURE — 0 LIDOCAINE 1 % SOLUTION 20 ML VIAL: Performed by: STUDENT IN AN ORGANIZED HEALTH CARE EDUCATION/TRAINING PROGRAM

## 2022-12-16 PROCEDURE — 88307 TISSUE EXAM BY PATHOLOGIST: CPT | Performed by: OBSTETRICS & GYNECOLOGY

## 2022-12-16 PROCEDURE — 86900 BLOOD TYPING SEROLOGIC ABO: CPT | Performed by: OBSTETRICS & GYNECOLOGY

## 2022-12-16 PROCEDURE — 25010000002 MORPHINE SULFATE (PF) 2 MG/ML SOLUTION 1 ML CARTRIDGE: Performed by: OBSTETRICS & GYNECOLOGY

## 2022-12-16 PROCEDURE — 49652 PR LAP, VENTRAL HERNIA REPAIR,REDUCIBLE: CPT | Performed by: STUDENT IN AN ORGANIZED HEALTH CARE EDUCATION/TRAINING PROGRAM

## 2022-12-16 PROCEDURE — 25010000002 FENTANYL CITRATE (PF) 100 MCG/2ML SOLUTION: Performed by: NURSE ANESTHETIST, CERTIFIED REGISTERED

## 2022-12-16 PROCEDURE — 25010000002 FENTANYL CITRATE (PF) 50 MCG/ML SOLUTION: Performed by: NURSE ANESTHETIST, CERTIFIED REGISTERED

## 2022-12-16 PROCEDURE — 25010000002 DROPERIDOL PER 5 MG: Performed by: NURSE ANESTHETIST, CERTIFIED REGISTERED

## 2022-12-16 PROCEDURE — C1781 MESH (IMPLANTABLE): HCPCS | Performed by: OBSTETRICS & GYNECOLOGY

## 2022-12-16 PROCEDURE — 25010000002 MIDAZOLAM PER 1 MG: Performed by: NURSE ANESTHETIST, CERTIFIED REGISTERED

## 2022-12-16 PROCEDURE — 86850 RBC ANTIBODY SCREEN: CPT | Performed by: OBSTETRICS & GYNECOLOGY

## 2022-12-16 PROCEDURE — 25010000002 ONDANSETRON PER 1 MG: Performed by: NURSE ANESTHETIST, CERTIFIED REGISTERED

## 2022-12-16 PROCEDURE — 86901 BLOOD TYPING SEROLOGIC RH(D): CPT | Performed by: OBSTETRICS & GYNECOLOGY

## 2022-12-16 PROCEDURE — 25010000002 DEXAMETHASONE PER 1 MG: Performed by: NURSE ANESTHETIST, CERTIFIED REGISTERED

## 2022-12-16 PROCEDURE — 25010000002 CEFAZOLIN PER 500 MG: Performed by: OBSTETRICS & GYNECOLOGY

## 2022-12-16 PROCEDURE — 58571 TLH W/T/O 250 G OR LESS: CPT | Performed by: OBSTETRICS & GYNECOLOGY

## 2022-12-16 DEVICE — DEV CONTRL TISS STRATAFIX SPIRAL PDS PLUS SZ0 CT/2 30CM VIL: Type: IMPLANTABLE DEVICE | Site: ABDOMEN | Status: FUNCTIONAL

## 2022-12-16 DEVICE — DEV WND/CLS CONTRL TISS STRATAFIX SYMM PDS PLS SZ0 45CM VIL: Type: IMPLANTABLE DEVICE | Site: ABDOMEN | Status: FUNCTIONAL

## 2022-12-16 DEVICE — ABSORBABLE WOUND CLOSURE DEVICE
Type: IMPLANTABLE DEVICE | Site: ABDOMEN | Status: FUNCTIONAL
Brand: V-LOC 90

## 2022-12-16 DEVICE — BARD SOFT MESH
Type: IMPLANTABLE DEVICE | Site: ABDOMEN | Status: FUNCTIONAL
Brand: BARD SOFT MESH

## 2022-12-16 RX ORDER — NEOSTIGMINE METHYLSULFATE 5 MG/5 ML
SYRINGE (ML) INTRAVENOUS AS NEEDED
Status: DISCONTINUED | OUTPATIENT
Start: 2022-12-16 | End: 2022-12-16 | Stop reason: SURG

## 2022-12-16 RX ORDER — IBUPROFEN 600 MG/1
600 TABLET ORAL EVERY 6 HOURS PRN
Qty: 40 TABLET | Refills: 0 | Status: SHIPPED | OUTPATIENT
Start: 2022-12-16

## 2022-12-16 RX ORDER — DROPERIDOL 2.5 MG/ML
0.62 INJECTION, SOLUTION INTRAMUSCULAR; INTRAVENOUS ONCE AS NEEDED
Status: COMPLETED | OUTPATIENT
Start: 2022-12-16 | End: 2022-12-16

## 2022-12-16 RX ORDER — BUPIVACAINE HCL/0.9 % NACL/PF 0.1 %
2 PLASTIC BAG, INJECTION (ML) EPIDURAL ONCE
Status: COMPLETED | OUTPATIENT
Start: 2022-12-16 | End: 2022-12-16

## 2022-12-16 RX ORDER — LABETALOL HYDROCHLORIDE 5 MG/ML
5 INJECTION, SOLUTION INTRAVENOUS
Status: DISCONTINUED | OUTPATIENT
Start: 2022-12-16 | End: 2022-12-16 | Stop reason: HOSPADM

## 2022-12-16 RX ORDER — LIDOCAINE HYDROCHLORIDE 10 MG/ML
0.5 INJECTION, SOLUTION EPIDURAL; INFILTRATION; INTRACAUDAL; PERINEURAL ONCE AS NEEDED
Status: DISCONTINUED | OUTPATIENT
Start: 2022-12-16 | End: 2022-12-16 | Stop reason: HOSPADM

## 2022-12-16 RX ORDER — KETOROLAC TROMETHAMINE 30 MG/ML
INJECTION, SOLUTION INTRAMUSCULAR; INTRAVENOUS AS NEEDED
Status: DISCONTINUED | OUTPATIENT
Start: 2022-12-16 | End: 2022-12-16 | Stop reason: SURG

## 2022-12-16 RX ORDER — OXYCODONE HYDROCHLORIDE 5 MG/1
5 TABLET ORAL EVERY 6 HOURS PRN
Qty: 12 TABLET | Refills: 0 | Status: SHIPPED | OUTPATIENT
Start: 2022-12-16 | End: 2022-12-19

## 2022-12-16 RX ORDER — SODIUM CHLORIDE, SODIUM LACTATE, POTASSIUM CHLORIDE, CALCIUM CHLORIDE 600; 310; 30; 20 MG/100ML; MG/100ML; MG/100ML; MG/100ML
1000 INJECTION, SOLUTION INTRAVENOUS CONTINUOUS
Status: DISCONTINUED | OUTPATIENT
Start: 2022-12-16 | End: 2022-12-16 | Stop reason: HOSPADM

## 2022-12-16 RX ORDER — NALOXONE HCL 0.4 MG/ML
0.4 VIAL (ML) INJECTION AS NEEDED
Status: DISCONTINUED | OUTPATIENT
Start: 2022-12-16 | End: 2022-12-16 | Stop reason: HOSPADM

## 2022-12-16 RX ORDER — DEXAMETHASONE SODIUM PHOSPHATE 4 MG/ML
INJECTION, SOLUTION INTRA-ARTICULAR; INTRALESIONAL; INTRAMUSCULAR; INTRAVENOUS; SOFT TISSUE AS NEEDED
Status: DISCONTINUED | OUTPATIENT
Start: 2022-12-16 | End: 2022-12-16 | Stop reason: SURG

## 2022-12-16 RX ORDER — PHENAZOPYRIDINE HYDROCHLORIDE 200 MG/1
200 TABLET, FILM COATED ORAL ONCE
Status: COMPLETED | OUTPATIENT
Start: 2022-12-16 | End: 2022-12-16

## 2022-12-16 RX ORDER — SODIUM CHLORIDE 0.9 % (FLUSH) 0.9 %
3 SYRINGE (ML) INJECTION AS NEEDED
Status: DISCONTINUED | OUTPATIENT
Start: 2022-12-16 | End: 2022-12-16 | Stop reason: HOSPADM

## 2022-12-16 RX ORDER — SODIUM CHLORIDE 0.9 % (FLUSH) 0.9 %
1-10 SYRINGE (ML) INJECTION AS NEEDED
Status: DISCONTINUED | OUTPATIENT
Start: 2022-12-16 | End: 2022-12-16 | Stop reason: HOSPADM

## 2022-12-16 RX ORDER — SODIUM CHLORIDE 9 MG/ML
40 INJECTION, SOLUTION INTRAVENOUS AS NEEDED
Status: DISCONTINUED | OUTPATIENT
Start: 2022-12-16 | End: 2022-12-16 | Stop reason: HOSPADM

## 2022-12-16 RX ORDER — SODIUM CHLORIDE, SODIUM LACTATE, POTASSIUM CHLORIDE, CALCIUM CHLORIDE 600; 310; 30; 20 MG/100ML; MG/100ML; MG/100ML; MG/100ML
100 INJECTION, SOLUTION INTRAVENOUS CONTINUOUS
Status: DISCONTINUED | OUTPATIENT
Start: 2022-12-16 | End: 2022-12-16 | Stop reason: HOSPADM

## 2022-12-16 RX ORDER — ONDANSETRON 2 MG/ML
INJECTION INTRAMUSCULAR; INTRAVENOUS AS NEEDED
Status: DISCONTINUED | OUTPATIENT
Start: 2022-12-16 | End: 2022-12-16 | Stop reason: SURG

## 2022-12-16 RX ORDER — OXYCODONE AND ACETAMINOPHEN 10; 325 MG/1; MG/1
1 TABLET ORAL ONCE AS NEEDED
Status: DISCONTINUED | OUTPATIENT
Start: 2022-12-16 | End: 2022-12-16 | Stop reason: HOSPADM

## 2022-12-16 RX ORDER — LIDOCAINE HYDROCHLORIDE 20 MG/ML
INJECTION, SOLUTION EPIDURAL; INFILTRATION; INTRACAUDAL; PERINEURAL AS NEEDED
Status: DISCONTINUED | OUTPATIENT
Start: 2022-12-16 | End: 2022-12-16 | Stop reason: SURG

## 2022-12-16 RX ORDER — FLUMAZENIL 0.1 MG/ML
0.2 INJECTION INTRAVENOUS AS NEEDED
Status: DISCONTINUED | OUTPATIENT
Start: 2022-12-16 | End: 2022-12-16 | Stop reason: HOSPADM

## 2022-12-16 RX ORDER — SUCCINYLCHOLINE/SOD CL,ISO/PF 200MG/10ML
SYRINGE (ML) INTRAVENOUS AS NEEDED
Status: DISCONTINUED | OUTPATIENT
Start: 2022-12-16 | End: 2022-12-16 | Stop reason: SURG

## 2022-12-16 RX ORDER — FENTANYL CITRATE 50 UG/ML
INJECTION, SOLUTION INTRAMUSCULAR; INTRAVENOUS AS NEEDED
Status: DISCONTINUED | OUTPATIENT
Start: 2022-12-16 | End: 2022-12-16 | Stop reason: SURG

## 2022-12-16 RX ORDER — EPHEDRINE SULFATE 50 MG/ML
INJECTION, SOLUTION INTRAVENOUS AS NEEDED
Status: DISCONTINUED | OUTPATIENT
Start: 2022-12-16 | End: 2022-12-16 | Stop reason: SURG

## 2022-12-16 RX ORDER — ACETAMINOPHEN 325 MG/1
650 TABLET ORAL EVERY 4 HOURS PRN
Qty: 100 TABLET | Refills: 2 | Status: SHIPPED | OUTPATIENT
Start: 2022-12-16 | End: 2023-12-16

## 2022-12-16 RX ORDER — SODIUM CHLORIDE 0.9 % (FLUSH) 0.9 %
3 SYRINGE (ML) INJECTION EVERY 12 HOURS SCHEDULED
Status: DISCONTINUED | OUTPATIENT
Start: 2022-12-16 | End: 2022-12-16 | Stop reason: HOSPADM

## 2022-12-16 RX ORDER — ROCURONIUM BROMIDE 10 MG/ML
INJECTION, SOLUTION INTRAVENOUS AS NEEDED
Status: DISCONTINUED | OUTPATIENT
Start: 2022-12-16 | End: 2022-12-16 | Stop reason: SURG

## 2022-12-16 RX ORDER — ACETAMINOPHEN 500 MG
1000 TABLET ORAL ONCE
Status: COMPLETED | OUTPATIENT
Start: 2022-12-16 | End: 2022-12-16

## 2022-12-16 RX ORDER — FENTANYL CITRATE 50 UG/ML
25 INJECTION, SOLUTION INTRAMUSCULAR; INTRAVENOUS
Status: COMPLETED | OUTPATIENT
Start: 2022-12-16 | End: 2022-12-16

## 2022-12-16 RX ORDER — SODIUM CHLORIDE 9 MG/ML
100 INJECTION, SOLUTION INTRAVENOUS CONTINUOUS
Status: DISCONTINUED | OUTPATIENT
Start: 2022-12-16 | End: 2022-12-16 | Stop reason: HOSPADM

## 2022-12-16 RX ORDER — ONDANSETRON 4 MG/1
4 TABLET, ORALLY DISINTEGRATING ORAL EVERY 8 HOURS PRN
Qty: 30 TABLET | Refills: 0 | Status: SHIPPED | OUTPATIENT
Start: 2022-12-16

## 2022-12-16 RX ORDER — PROPOFOL 10 MG/ML
VIAL (ML) INTRAVENOUS AS NEEDED
Status: DISCONTINUED | OUTPATIENT
Start: 2022-12-16 | End: 2022-12-16 | Stop reason: SURG

## 2022-12-16 RX ORDER — MAGNESIUM HYDROXIDE 1200 MG/15ML
LIQUID ORAL AS NEEDED
Status: DISCONTINUED | OUTPATIENT
Start: 2022-12-16 | End: 2022-12-16 | Stop reason: HOSPADM

## 2022-12-16 RX ORDER — IBUPROFEN 600 MG/1
600 TABLET ORAL ONCE AS NEEDED
Status: COMPLETED | OUTPATIENT
Start: 2022-12-16 | End: 2022-12-16

## 2022-12-16 RX ORDER — SODIUM CHLORIDE 0.9 % (FLUSH) 0.9 %
10 SYRINGE (ML) INJECTION EVERY 12 HOURS SCHEDULED
Status: DISCONTINUED | OUTPATIENT
Start: 2022-12-16 | End: 2022-12-16 | Stop reason: HOSPADM

## 2022-12-16 RX ORDER — MIDAZOLAM HYDROCHLORIDE 1 MG/ML
1 INJECTION INTRAMUSCULAR; INTRAVENOUS
Status: COMPLETED | OUTPATIENT
Start: 2022-12-16 | End: 2022-12-16

## 2022-12-16 RX ORDER — SODIUM CHLORIDE 0.9 % (FLUSH) 0.9 %
3-10 SYRINGE (ML) INJECTION AS NEEDED
Status: DISCONTINUED | OUTPATIENT
Start: 2022-12-16 | End: 2022-12-16 | Stop reason: HOSPADM

## 2022-12-16 RX ORDER — DOCUSATE SODIUM 100 MG/1
100 CAPSULE, LIQUID FILLED ORAL 2 TIMES DAILY
Qty: 60 CAPSULE | Refills: 1 | Status: SHIPPED | OUTPATIENT
Start: 2022-12-16

## 2022-12-16 RX ORDER — SODIUM CHLORIDE 0.9 % (FLUSH) 0.9 %
10 SYRINGE (ML) INJECTION AS NEEDED
Status: DISCONTINUED | OUTPATIENT
Start: 2022-12-16 | End: 2022-12-16 | Stop reason: HOSPADM

## 2022-12-16 RX ORDER — OXYCODONE AND ACETAMINOPHEN 7.5; 325 MG/1; MG/1
2 TABLET ORAL EVERY 4 HOURS PRN
Status: DISCONTINUED | OUTPATIENT
Start: 2022-12-16 | End: 2022-12-16 | Stop reason: HOSPADM

## 2022-12-16 RX ORDER — ONDANSETRON 2 MG/ML
4 INJECTION INTRAMUSCULAR; INTRAVENOUS ONCE AS NEEDED
Status: DISCONTINUED | OUTPATIENT
Start: 2022-12-16 | End: 2022-12-16 | Stop reason: HOSPADM

## 2022-12-16 RX ADMIN — FENTANYL CITRATE 25 MCG: 50 INJECTION INTRAMUSCULAR; INTRAVENOUS at 14:41

## 2022-12-16 RX ADMIN — EPHEDRINE SULFATE 15 MG: 50 INJECTION INTRAVENOUS at 11:50

## 2022-12-16 RX ADMIN — SODIUM CHLORIDE, POTASSIUM CHLORIDE, SODIUM LACTATE AND CALCIUM CHLORIDE 1000 ML: 600; 310; 30; 20 INJECTION, SOLUTION INTRAVENOUS at 09:46

## 2022-12-16 RX ADMIN — ROCURONIUM BROMIDE 30 MG: 10 SOLUTION INTRAVENOUS at 10:52

## 2022-12-16 RX ADMIN — OXYCODONE HYDROCHLORIDE AND ACETAMINOPHEN 2 TABLET: 7.5; 325 TABLET ORAL at 14:31

## 2022-12-16 RX ADMIN — Medication 2 G: at 10:55

## 2022-12-16 RX ADMIN — FENTANYL CITRATE 50 MCG: 50 INJECTION INTRAMUSCULAR; INTRAVENOUS at 11:10

## 2022-12-16 RX ADMIN — MIDAZOLAM HYDROCHLORIDE 1 MG: 2 INJECTION, SOLUTION INTRAMUSCULAR; INTRAVENOUS at 10:23

## 2022-12-16 RX ADMIN — MIDAZOLAM HYDROCHLORIDE 1 MG: 2 INJECTION, SOLUTION INTRAMUSCULAR; INTRAVENOUS at 10:07

## 2022-12-16 RX ADMIN — ACETAMINOPHEN 1000 MG: 500 TABLET ORAL at 10:08

## 2022-12-16 RX ADMIN — SODIUM CHLORIDE, POTASSIUM CHLORIDE, SODIUM LACTATE AND CALCIUM CHLORIDE 1000 ML: 600; 310; 30; 20 INJECTION, SOLUTION INTRAVENOUS at 09:45

## 2022-12-16 RX ADMIN — PROPOFOL 200 MG: 10 INJECTION, EMULSION INTRAVENOUS at 10:47

## 2022-12-16 RX ADMIN — Medication 120 MG: at 10:47

## 2022-12-16 RX ADMIN — FENTANYL CITRATE 100 MCG: 50 INJECTION INTRAMUSCULAR; INTRAVENOUS at 10:47

## 2022-12-16 RX ADMIN — ROCURONIUM BROMIDE 10 MG: 10 SOLUTION INTRAVENOUS at 10:47

## 2022-12-16 RX ADMIN — DEXAMETHASONE SODIUM PHOSPHATE 8 MG: 4 INJECTION, SOLUTION INTRA-ARTICULAR; INTRALESIONAL; INTRAMUSCULAR; INTRAVENOUS; SOFT TISSUE at 11:22

## 2022-12-16 RX ADMIN — GLYCOPYRROLATE 0.4 MG: 0.2 INJECTION INTRAMUSCULAR; INTRAVENOUS at 13:08

## 2022-12-16 RX ADMIN — DROPERIDOL 0.62 MG: 2.5 INJECTION, SOLUTION INTRAMUSCULAR; INTRAVENOUS at 14:25

## 2022-12-16 RX ADMIN — EPHEDRINE SULFATE 10 MG: 50 INJECTION INTRAVENOUS at 12:53

## 2022-12-16 RX ADMIN — Medication 3 MG: at 13:08

## 2022-12-16 RX ADMIN — FENTANYL CITRATE 50 MCG: 50 INJECTION INTRAMUSCULAR; INTRAVENOUS at 13:13

## 2022-12-16 RX ADMIN — IBUPROFEN 600 MG: 600 TABLET, FILM COATED ORAL at 16:02

## 2022-12-16 RX ADMIN — PHENAZOPYRIDINE HYDROCHLORIDE 200 MG: 200 TABLET ORAL at 09:44

## 2022-12-16 RX ADMIN — FENTANYL CITRATE 25 MCG: 50 INJECTION INTRAMUSCULAR; INTRAVENOUS at 13:50

## 2022-12-16 RX ADMIN — KETOROLAC TROMETHAMINE 30 MG: 30 INJECTION, SOLUTION INTRAMUSCULAR; INTRAVENOUS at 13:04

## 2022-12-16 RX ADMIN — FENTANYL CITRATE 25 MCG: 50 INJECTION INTRAMUSCULAR; INTRAVENOUS at 13:40

## 2022-12-16 RX ADMIN — FENTANYL CITRATE 25 MCG: 50 INJECTION INTRAMUSCULAR; INTRAVENOUS at 14:36

## 2022-12-16 RX ADMIN — ONDANSETRON 4 MG: 2 INJECTION INTRAMUSCULAR; INTRAVENOUS at 12:55

## 2022-12-16 RX ADMIN — EPHEDRINE SULFATE 10 MG: 50 INJECTION INTRAVENOUS at 11:02

## 2022-12-16 RX ADMIN — LIDOCAINE HYDROCHLORIDE 100 MG: 20 INJECTION, SOLUTION EPIDURAL; INFILTRATION; INTRACAUDAL; PERINEURAL at 10:47

## 2022-12-16 NOTE — DISCHARGE SUMMARY
[facility]    Leonardo Wood MD  [address]  [place] [address]  [phone]      Discharge Summary      Yarely Cevallos  : 1960  MRN: [id number]  CSN: [id number]    Date of Admission: 2022   Date of Discharge:  2022           Admission Diagnosis: 1. Cyst of left ovary [N83.202]  2. Pelvic pain [R10.2]  3. Non-recurrent unilateral inguinal hernia without obstruction or gangrene [K40.90]  4. Umbilical hernia without obstruction and without gangrene [K42.9]     Discharge Diagnosis: 1. Left ovarian cyst  2. Pelvic pain  3. Left inguinal hernia  4. Umbilical hernia  5. Cervical leiomyoma             Presenting Problem/History of Present Illness  Active Hospital Problems    Diagnosis  POA   • Cervical leiomyoma [D25.9]  Unknown   • Cyst of left ovary [N83.202]  Yes   • Pelvic pain [R10.2]  Yes   • Non-recurrent unilateral inguinal hernia without obstruction or gangrene [K40.90]  Yes   • Umbilical hernia without obstruction and without gangrene [K42.9]  Yes      Resolved Hospital Problems   No resolved problems to display.        Hospital Course  Patient is a 61 y.o.  who presented for scheduled robot-assisted laparoscopic hysterectomy with bilateral salpingo-oophorectomy and cystoscopy by gynecology, and left inguinal hernia repair and umbilical hernia repair by surgery secondary to left ovarian cyst as well as pelvic pain and left inguinal hernia and umbilical hernia. See operative report for details. She tolerated the procedure well and is stable for discharge home.    Procedures Performed  Procedure(s):  TOTAL LAPAROSCOPIC HYSTERECTOMY BILATERAL SALPINGOOPHORECTOMY WITH DAVINCI ROBOT, CYSTO  LEFT INGUINAL HERNIA REPAIR LAPAROSCOPIC WITH DAVINCI ROBOT WITH PRIMARY UMBILICAL HERNIA REPAIR - combo case with Dr. Wood  PRIMARY UMBILICAL HERNIA REPAIR - combo case with Dr. Wood    Consults:   Consults     No orders found from 2022 to 2022.           Condition on Discharge:  Stable    Vital Signs  Temp:  [98.2 °F (36.8 °C)] 98.2 °F (36.8 °C)  Heart Rate:  [61-76] 76  Resp:  [16] 16  BP: (153)/(79) 153/79    Physical Exam:   No exam performed today,    Discharge Disposition  Stable for discharge home     Discharge Medications     Discharge Medications      New Medications      Instructions Start Date   acetaminophen 325 MG tablet  Commonly known as: Tylenol   650 mg, Oral, Every 4 Hours PRN      ibuprofen 600 MG tablet  Commonly known as: ADVIL,MOTRIN   600 mg, Oral, Every 6 Hours PRN      ondansetron ODT 4 MG disintegrating tablet  Commonly known as: ZOFRAN-ODT   4 mg, Translingual, Every 8 Hours PRN      oxyCODONE 5 MG immediate release tablet  Commonly known as: Roxicodone   5 mg, Oral, Every 6 Hours PRN         Changes to Medications      Instructions Start Date   docusate sodium 100 MG capsule  Commonly known as: COLACE  What changed:   · when to take this  · reasons to take this   100 mg, Oral, 2 Times Daily      docusate sodium 100 MG capsule  Commonly known as: Colace  What changed: You were already taking a medication with the same name, and this prescription was added. Make sure you understand how and when to take each.   100 mg, Oral, 2 Times Daily         Continue These Medications      Instructions Start Date   diazePAM 5 MG tablet  Commonly known as: VALIUM   5 mg, Oral, Nightly      diphenhydrAMINE 25 mg capsule  Commonly known as: BENADRYL   25 mg, Oral, Every 6 Hours PRN      pantoprazole 40 MG EC tablet  Commonly known as: PROTONIX   pantoprazole 40 mg tablet,delayed release      promethazine 25 MG tablet  Commonly known as: PHENERGAN   25 mg, Oral, Daily PRN      Qulipta 30 MG tablet  Generic drug: Atogepant   30 mg, Daily      rizatriptan 10 MG tablet  Commonly known as: MAXALT   10 mg, Oral, Once As Needed, May repeat in 2 hours if needed             Discharge Diet: regular home diet    Activity at Discharge: pelvic rest, no lifting  greater than 10 pounds    Follow-up Appointments  Future Appointments   Date Time Provider Department Center   12/29/2022  1:00 PM Leonardo Wood MD [facility]         Test Results Pending at Discharge  Pending Labs     Order Current Status    Tissue Pathology Exam Collected (12/16/22 9915)           Leonardo Wood MD  12/16/22  13:21 CST    Time: Discharge <30 min

## 2022-12-16 NOTE — OP NOTE
BH Berneneil Cevallos  : 1960  MRN: [id number]  [facility]: [id number]  Date: 2022    Operative Note      Pre-op Diagnosis:  Cyst of left ovary [N83.202]  Pelvic pain [R10.2]   Left inguinal hernia  Periumbilical hernia   Post-op Diagnosis:  Left ovarian cyst  Pelvic pain  Left inguinal hernia  Periumbilical hernia  Cervical leiomyoma   Procedure: Procedure(s):  TOTAL LAPAROSCOPIC HYSTERECTOMY BILATERAL SALPINGOOPHORECTOMY WITH DAVINCI ROBOT, CYSTO  LEFT INGUINAL HERNIA REPAIR LAPAROSCOPIC WITH DAVINCI ROBOT WITH PRIMARY UMBILICAL HERNIA REPAIR - combo case with Dr. Wood  PRIMARY UMBILICAL HERNIA REPAIR - combo case with Dr. Wood       Surgeon: Surgeon(s):  Leonardo Wood MD Williams, Kristen N, MD       Anesthesia: General     Estimated Blood Loss: 25   mls   Fluids: See anesthesia flow sheet   Urine output: 600 mL   Drains: none   ABx: IV 2g Ancef       Specimens:  Uterus, cervix, bilateral ovaries and fallopian tubes, cervical leiomyoma   Findings: Bimanual exam revealed mobile, normal uterus without palpable adnexal masses. Ovaries palpated normal. On laparoscopy uterus and bilateral ovarian and fallopian tubes appeared normal. Cervical leiomyoma present. Liver and gallbladder, stomach, and appendix appeared normal. Cystoscopy with bilateral spill of pyridium stained urine from each ureteral orifice. Bladder was water tight without foreign bodies, masses/tumors, or lesions on cystoscopy.   Complications: none     INDICATION: Yarely Cevallos is a 61 y.o. female presents for hysterectomy secondary to pelvic pain, left ovarian cyst. She desires definitive management in the form of hysterectomy.    PROCEDURE IN DETAIL:  Patient was taken to the operating room where general anesthesia was established. She was placed in the dorsal lithotomy position using Ravin stirrups to support the lower extremities with her arms tucked to her side. Antibiotics were confirmed to have been administered. She was prepped  and draped in the usual sterile fashion. A Miller catheter was inserted. A time-out procedure was performed to confirm the correct procedure and correct patient.     A speculum was then placed into the vagina where the anterior cervix was grasped with a single-tooth tenaculum.  Cervix was then dilated to allow for introduction of the V-care uterine manipulator.  2-0 Vicryl stay sutures were then placed on the cervix at the 3- and 9-o'clock positions. The small V-care uterine manipulator was placed and the stay sutures were tied to the V-care. Attention was then turned to the abdomen. Please see Dr. Dykes' note for details.     Following Dr. Dykes, the robotic device was then configured for hysterectomy with the following instruments in each robotic port (from patient left to right):   1) none  2) fenestrated bipolar forceps   3) 0 degree camera   4) monopolar scissors     Attention was then turned to the pelvis. The right round ligament was identified, coagulated with the history of bipolar's.  Round ligament was then divided with the monopolar scissors.  Dissection was carried out along the peritoneum towards the infundibulopelvic ligament on the right.  Ureter was identified on the right and was noted to be posterior as well as medial of the right infundibulopelvic ligament.  The right IP was then cauterized with the fenestrated bipolar's and then cut.  Hemostasis was appreciated at this point. The monopolar scissors were then used to incise the broad ligament anteriorly to develop the right side of the bladder flap. Attention was then turned to the left side of the uterus. The left round ligament was identified.  The peritoneum on that side had been previously been dissected along with the left round ligament by general surgery.  The peritoneum was then opened up on that side towards the left infundibulopelvic ligament.  Left ureter was identified on that side in a similar fashion.  The left IP was then  grasped with the fenestrated bipolar's and coagulated.  It was then cut with the monopolar scissors.  Hemostasis was noted.  The broad ligament on the left side was then further developed towards the bladder and the uterosacrals using a monopolar scissors. The left uterine artery was then cauterized with the fenestrated bipolar and then cut with the monopolar scissors. This was again repeated in a similar fashion for the right uterine artery. The bladder was then further retracted off the lower uterine segment and off the cervix anteriorly with blunt and sharp dissection with the monopolar scissors. At this point, the uterus was noted to be blanched in color. The uterus and cervix were then amputated from the vagina using the monopolar cautery. The uterus was then extracted through the vagina. The vaginal cuff was then closed using the #1 Stratafix suture in a running fashion starting at the left vaginal cuff apex and advancing towards the right apex. The remaining Stratafix suture was placed in a running fashion starting at the right vaginal cuff apex and was advanced to the left vaginal cuff apex as an imbricating stitch. Good hemostasis was noted. Attention was then turned to the perineum.     The Miller catheter was removed and the cystoscope was then introduced through the urethra into the bladder. Under direct visualization using normal saline solution distending media, the ureters were identified bilaterally and efflux was noted from the ureteral orifices bilaterally. The cystoscope was removed and the Miller catheter was replaced. The perineum was evaluated and noted with a small first-degree perineal laceration at midline.  This laceration was repaired with a 2-0 Vicryl in a running locked fashion.. This concluded the procedure from a gynecological standpoint.  Dr. Dykes return to the OR to repair the umbilical hernia defect.  Please see her note for details. Pneumoperitoneum was evacuated and all  laparoscopic trocars were removed. The skin incisions were then closed with 4-O Monocryl in simple, interrupted stitches. Skin glue was applied to the incisions.     All needle, sponge, and instrument counts were noted to be correct x 3 at the end of the procedure. The patient tolerated the procedure well and was taken to the recovery room in stable condition.       Leonardo Wood MD   12/16/2022  13:12 CST

## 2022-12-16 NOTE — ANESTHESIA PROCEDURE NOTES
Airway  Urgency: elective    Date/Time: 12/16/2022 10:48 AM  Airway not difficult    General Information and Staff    Patient location during procedure: OR  CRNA/CAA: Kael aDs CRNA    Indications and Patient Condition  Indications for airway management: airway protection    Preoxygenated: yes  Mask difficulty assessment: 1 - vent by mask    Final Airway Details  Final airway type: endotracheal airway      Successful airway: ETT  Cuffed: yes   Successful intubation technique: direct laryngoscopy  Facilitating devices/methods: intubating stylet  Endotracheal tube insertion site: oral  Blade: [place]  Blade size: 2  ETT size (mm): 7.0  Cormack-Lehane Classification: grade I - full view of glottis  Placement verified by: capnometry   Cuff volume (mL): 7  Measured from: lips  ETT/EBT  to lips (cm): 21  Number of attempts at approach: 1  Assessment: lips, teeth, and gum same as pre-op and atraumatic intubation

## 2022-12-16 NOTE — INTERVAL H&P NOTE
H&P reviewed. The patient was examined and there are no changes to the H&P.    Surgical Counseling  The patient was informed of the risks and benefits of the planned procedures. The risks included but were not limited to: bleeding, infection, injury to surrounding structures potentially including the vascular, gastrointestinal, and genitourinary systems, nerve injury, possible blood transfusion and its associated risks, possible bilateral oophorectomy. Patient was counseled on the possibility of a laparotomy, and all other indicated procedures. We discussed the possibility of difficulties with anesthesia, potential exacerbations to other health conditions, and potential concern for chronic pain that follows any surgery. Patient expressed understanding of the risks involved. Her questions were answered to her satisfaction. No guarantees were made or implied. The patient consented to proceed with the planned procedures.

## 2022-12-16 NOTE — ANESTHESIA POSTPROCEDURE EVALUATION
Patient: Yarely Cevallos    Procedure Summary     Date: 12/16/22 Room / Location:  [facility] OR  /  [facility] OR    Anesthesia Start: 1042 Anesthesia Stop: 1316    Procedures:       TOTAL LAPAROSCOPIC HYSTERECTOMY BILATERAL SALPINGOOPHORECTOMY WITH DAVINCI ROBOT, CYSTO (Abdomen)      LEFT INGUINAL HERNIA REPAIR LAPAROSCOPIC WITH DAVINCI ROBOT WITH PRIMARY UMBILICAL HERNIA REPAIR - combo case with Dr. Wood (Left: Abdomen)      PRIMARY UMBILICAL HERNIA REPAIR - combo case with Dr. Wood (Abdomen) Diagnosis:       Cyst of left ovary      Pelvic pain      (Cyst of left ovary [N83.202])      (Pelvic pain [R10.2])    Surgeons: Leonardo Wood MD; Kisha Dykes MD Provider: Kael Das CRNA    Anesthesia Type: general ASA Status: 2          Anesthesia Type: general    Vitals  Vitals Value Taken Time   /78 12/16/22 1447   Temp 96.8 °F (36 °C) 12/16/22 1315   Pulse 59 12/16/22 1448   Resp 13 12/16/22 1430   SpO2 96 % 12/16/22 1448   Vitals shown include unvalidated device data.        Post Anesthesia Care and Evaluation      Comments: Discharged per PACU Criteria

## 2022-12-16 NOTE — OP NOTE
Laparoscopic Robotic-Assisted Left Inguinal Hernia Repair with Mesh:     Patient: Yarely Cevallos  MRN: [id number]    YOB: 1960  Age: 61 y.o.  Sex: female  Unit:  PAD OR Room/Bed: PAD OR/MAIN OR Location: [facility]    Admitting Physician: LEONARDO WOOD    Primary Care Physician: Vladislav Briggs MD             INDICATIONS: This is a 61 y.o. year old female who presents with a left sided inguinal hernia. It is symptomatic and limiting the patient's daily activities. After a discussion of risks, benefits and alternatives (see H&P), consent was obtained.     DATE OF OPERATION: 12/16/2022     Surgeon(s) and Role:  Panel 1:     * Leonardo Wood MD - Primary  Panel 2:     * Kisha Dykes MD - Primary    ANESTHESIA: Choice     PREOPERATIVE DIAGNOSIS: Cyst of left ovary [N83.202]  Pelvic pain [R10.2]    POSTOPERATIVE DIAGNOSIS: Same, direct left inguinal hernia; 0.75cm umbilical hernia    PROCEDURES PERFORMED:    (1) Laparoscopic, robotic-assisted left inguinal hernia repair with mesh   (2) Primary repair of Umbilical hernia     PROCEDURE DETAILS:   After informed consent was obtained, the patient was brought to the operating room. Pre-operative antibiotics were administered and SCDs were placed. General anesthesia was induced. The abdomen was prepped with chloraprep and draped in a sterile fashion. A time-out was performed verifying the correct patient, procedure, and side. A 5mm incision was made along the left subcostal margin. The veress was inserted and the abdomen was insufflated to 15mmHg. The veress was removed and a 5mm camera with an optiview was inserted. There was a 0.75cm umbilical hernia. An 8mm trocar was placed through the hernia just superior to the umbilicus.  A laparoscopic TAP block was performed with my deep local.   Two 8mm trocars were placed on each side laterally approximately 8cm lateral to the umbilical port.  The bed was flexed. We then docked the robot in the usual  "fashion from the left side.  The hook was placed in the right arm, and a prograsp was placed in the left arm. The peritoneum was opened high on the abdominal wall on the left. There was a direct left inguinal hernia. The peritoneum was taken down and the hernia sac was reduced back into the abdomen. The round ligament was divided. The pubic tubercle was dissected clean as was Antwan's ligament on the left side. Once the space was completed dissected out, a 4\" x 6\" Bard soft mesh was placed into the abdomen. Then using a [employer] needle , we positioned the mesh in the inguinal space, covering the direct, indirect, and femoral spaces. It fit very nicely in the space. A 2-0 vicryl suture was placed in the abdomen and it was used to suture the mesh in place - 2 sutures on Antwan's ligament, a suture medial to the inferior epigastric vessels, a suture lateral to the inferior epigastric vessels, and one suture to cover the direct space. The peritoneum was closed with a 2-0 V-lock absorbable suture.  The needles were removed from the abdomen and needle counts were correct. Dr. Wood then proceeded with the hysterectomy. When he was done, I returned to the OR. The robot was undocked. The umbilical hernia defect was <1cm so I elected to close it primarily with an 0-vicryl on a laparoscopic suture passer with a figure-of-eight suture.  The trocars were removed, and the abdomen was desufflated.  Dr. Wood completed the skin incisions closure. The patient was transferred to PACU in good condition.       Findings: left direct inguinal hernia, umbilical hernia   Estimated Blood Loss: 10mL  Complications: None apparent            Specimens: None from my portion of the case     Disposition: PACU - hemodynamically stable.           Condition: stable    Kisha Dykes MD  12/05/2022        "

## 2022-12-16 NOTE — ANESTHESIA PREPROCEDURE EVALUATION
Anesthesia Evaluation     no history of anesthetic complications:  NPO Solid Status: > 8 hours  NPO Liquid Status: > 8 hours           Airway   Mallampati: II  TM distance: >3 FB  Neck ROM: full  No difficulty expected  Dental - normal exam     Pulmonary - normal exam   Cardiovascular - negative cardio ROS and normal exam  Exercise tolerance: good (4-7 METS)        Neuro/Psych  GI/Hepatic/Renal/Endo    (+)  GERD well controlled,  renal disease stones,     Musculoskeletal (-) negative ROS    Abdominal  - normal exam   Substance History - negative use     OB/GYN          Other - negative ROS                       Anesthesia Plan    ASA 2     general     intravenous induction     Anesthetic plan, risks, benefits, and alternatives have been provided, discussed and informed consent has been obtained with: patient.        CODE STATUS:

## 2022-12-20 LAB
CYTO UR: NORMAL
LAB AP CASE REPORT: NORMAL
Lab: NORMAL
PATH REPORT.FINAL DX SPEC: NORMAL
PATH REPORT.GROSS SPEC: NORMAL

## 2022-12-27 ENCOUNTER — TELEPHONE (OUTPATIENT)
Dept: OBSTETRICS AND GYNECOLOGY | Facility: CLINIC | Age: 62
End: 2022-12-27

## 2022-12-27 RX ORDER — FLUCONAZOLE 150 MG/1
150 TABLET ORAL
Qty: 3 TABLET | Refills: 0 | Status: SHIPPED | OUTPATIENT
Start: 2022-12-27 | End: 2023-01-03

## 2022-12-27 NOTE — TELEPHONE ENCOUNTER
Pt called office with c/o yeast infection symptoms. Pt states that she has appt 12/29/22 and was desperate to have something now. Pt wanted to get monistat. Pt advised not to place anything in the vagina for 2 weeks from surgery date. Please advise.

## 2022-12-27 NOTE — TELEPHONE ENCOUNTER
Pt notified that Dr Wood called in diflucan to pharmacy. I stressed to the patient nothing is to go in the vagina and not to soak in water. Pt voices understanding.

## 2022-12-29 ENCOUNTER — OFFICE VISIT (OUTPATIENT)
Dept: SURGERY | Facility: CLINIC | Age: 62
End: 2022-12-29

## 2022-12-29 ENCOUNTER — OFFICE VISIT (OUTPATIENT)
Dept: OBSTETRICS AND GYNECOLOGY | Facility: CLINIC | Age: 62
End: 2022-12-29

## 2022-12-29 VITALS
HEIGHT: 66 IN | BODY MASS INDEX: 30.53 KG/M2 | WEIGHT: 190 LBS | HEART RATE: 45 BPM | OXYGEN SATURATION: 98 % | SYSTOLIC BLOOD PRESSURE: 149 MMHG | DIASTOLIC BLOOD PRESSURE: 77 MMHG

## 2022-12-29 VITALS
BODY MASS INDEX: 30.57 KG/M2 | DIASTOLIC BLOOD PRESSURE: 78 MMHG | HEIGHT: 66 IN | WEIGHT: 190.2 LBS | SYSTOLIC BLOOD PRESSURE: 110 MMHG

## 2022-12-29 DIAGNOSIS — Z98.890 S/P INGUINAL HERNIA REPAIR: Primary | ICD-10-CM

## 2022-12-29 DIAGNOSIS — N89.8 VAGINAL ITCHING: ICD-10-CM

## 2022-12-29 DIAGNOSIS — B37.31 CANDIDIASIS, VAGINA: ICD-10-CM

## 2022-12-29 DIAGNOSIS — D25.1 INTRAMURAL LEIOMYOMA OF UTERUS: ICD-10-CM

## 2022-12-29 DIAGNOSIS — Z09 POSTOPERATIVE FOLLOW-UP: Primary | ICD-10-CM

## 2022-12-29 DIAGNOSIS — N76.0 BV (BACTERIAL VAGINOSIS): ICD-10-CM

## 2022-12-29 DIAGNOSIS — B37.31 VULVAR CANDIDIASIS: ICD-10-CM

## 2022-12-29 DIAGNOSIS — N80.03 ADENOMYOSIS: ICD-10-CM

## 2022-12-29 DIAGNOSIS — B96.89 BV (BACTERIAL VAGINOSIS): ICD-10-CM

## 2022-12-29 DIAGNOSIS — Z87.19 S/P INGUINAL HERNIA REPAIR: Primary | ICD-10-CM

## 2022-12-29 PROBLEM — G43.909 MIGRAINE: Status: ACTIVE | Noted: 2022-03-21

## 2022-12-29 PROCEDURE — 99024 POSTOP FOLLOW-UP VISIT: CPT | Performed by: STUDENT IN AN ORGANIZED HEALTH CARE EDUCATION/TRAINING PROGRAM

## 2022-12-29 PROCEDURE — 99213 OFFICE O/P EST LOW 20 MIN: CPT | Performed by: OBSTETRICS & GYNECOLOGY

## 2022-12-29 PROCEDURE — 99024 POSTOP FOLLOW-UP VISIT: CPT | Performed by: OBSTETRICS & GYNECOLOGY

## 2022-12-29 RX ORDER — CLOBETASOL PROPIONATE 0.5 MG/G
1 CREAM TOPICAL 2 TIMES DAILY
Qty: 30 G | Refills: 1 | Status: SHIPPED | OUTPATIENT
Start: 2022-12-29

## 2022-12-29 RX ORDER — METRONIDAZOLE 500 MG/1
500 TABLET ORAL 2 TIMES DAILY
Qty: 14 TABLET | Refills: 0 | Status: SHIPPED | OUTPATIENT
Start: 2022-12-29 | End: 2023-01-05

## 2022-12-29 RX ORDER — FLUCONAZOLE 150 MG/1
TABLET ORAL
COMMUNITY

## 2022-12-29 NOTE — PROGRESS NOTES
"Patient: Yarely Cevallos    YOB: 1960    Date: 12/29/2022    Primary Care Provider: Vladislav Briggs MD    Vital Signs:   Vitals:    12/29/22 1403   BP: 149/77   BP Location: Left arm   Patient Position: Sitting   Cuff Size: Adult   Pulse: (!) 45   SpO2: 98%   Weight: 86.2 kg (190 lb)   Height: 167.6 cm (65.98\")       Overall doing well s/p left inguinal hernia repair with mesh and primary umbilical hernia repair done in conjunction with Dr. Wood and her hysterectomy on 12/16/22. No fevers. Tolerating diet. Energy improving. Pain improving. No troubles with bowel or bladder function. Wounds healing well. Repair intact.    Results Review:  Pathology and operative findings reviewed with patient.        Assessment / Plan:  It has been reiterated that the patient should limit strenuous activity during the post op period and limit lifting to <35 pounds for the first four weeks post op then slowly return to normal.  Diagnoses and all orders for this visit:    1. S/P inguinal hernia repair (Primary)      Follow up as needed. Call with any questions or concerns.     Electronically signed by Kisha Dykes MD  12/29/22  14:19 CST                    "

## 2022-12-29 NOTE — PATIENT INSTRUCTIONS

## 2022-12-29 NOTE — PROGRESS NOTES
"    Leonardo Wood MD  [facility]  [address]  [place] [address]  Office [phone]  Fax [phone]      Subjective   Subjective     Yarely Cevallos is a 62 y.o. female who presents to the clinic 2 weeks status post Robot-assisted total laparoscopic hysterectomy with bilateral salpingo-oophorectomy, cystoscopy with oncology, status post inguinal hernia as well as periumbilical hernia repair by general surgery for Pelvic pain, left ovarian cyst. Eating a regular diet without difficulty. Bowel movements are normal. The patient is not having any pain.  Her main complaint is vulvar and vaginal pruritus and discomfort.  Similar to a diaper rash.  She is states she has Diflucan and has had minimal relief thus far with a yeast infection.  No other complaints at this time.    The following portions of the patient's history were reviewed and updated as appropriate: allergies, current medications, past family history, past medical history, past social history, past surgical history and problem list.    Review of Systems  ROS notable for: vulvar/vaginal pruritus          Objective   Objective     Visit Vitals  /78   Ht 167.6 cm (66\")   Wt 86.3 kg (190 lb 3.2 oz)   BMI 30.70 kg/m²     Physical Exam  Vitals and nursing note reviewed. Exam conducted with a chaperone present.   Constitutional:       Appearance: Normal appearance.   HENT:      Head: Normocephalic and atraumatic.   Eyes:      General: No scleral icterus.     Conjunctiva/sclera: Conjunctivae normal.   Abdominal:      General: There is no distension.      Palpations: Abdomen is soft.      Tenderness: There is no abdominal tenderness. There is no guarding or rebound.      Comments: Incisions: clean and dry, appears to be healing well, no erythema/bleeding/discharge from incisions   Genitourinary:     General: Normal vulva.      Exam position: Lithotomy position.      Pubic Area: No rash or pubic lice.       Labia:         Right: No rash, " tenderness or lesion.         Left: No rash, tenderness or lesion.       Urethra: No prolapse or urethral lesion.      Vagina: No signs of injury and foreign body. Erythema present. No vaginal discharge, tenderness, bleeding, lesions or prolapsed vaginal walls.      Rectum: No external hemorrhoid.                Comments: Consent for exam obtained verbally from patient.  Neurological:      Mental Status: She is alert.               Result Review    Tissue Pathology Exam (12/16/2022 12:39)  Uterus with bilateral fallopian tubes and bilateral ovaries:  A.  No cervical dysplasia identified although there is no identifiable ectocervical tissue present.  B.  Endocervical nabothian cysts.  C.  Basalis endometrium, negative for atypia.  D.  Endometrial polyp (0.6 cm).  E.  Uterine adenomyosis.  F.  Uterine leiomyoma (0.4 cm) and a detached leiomyoma (3.5 cm).  G.  No histopathologic abnormalities, right and left fallopian tubes.  H.  Involutional changes, right and left ovaries.  I.  No histologic evidence of malignancy.       Assessment & Plan   Assessment / Plan    Doing well postoperatively.  Operative findings again reviewed.   Pathology report discussed.  Incision care reviewed.  Activity restrictions reviewed: No lifting greater than 10 pounds for total of 6 weeks following surgery, progressed  Anticipated return to work: 4 weeks.    Diagnoses and all orders for this visit:    1. Postoperative follow-up (Primary)    2. Intramural leiomyoma of uterus    3. Adenomyosis    4. Vaginal itching  -     NuSwab Vaginitis (VG) - Swab, Vagina  -     clobetasol (Temovate) 0.05 % cream; Apply 1 application topically to the appropriate area as directed 2 (Two) Times a Day. Apply to the vagina/vulva/perineum  Dispense: 30 g; Refill: 1    5. Candidiasis, vagina  -     NuSwab Vaginitis (VG) - Swab, Vagina  -     metroNIDAZOLE (Flagyl) 500 MG tablet; Take 1 tablet by mouth 2 (Two) Times a Day for 7 days.  Dispense: 14 tablet; Refill:  0  -     clobetasol (Temovate) 0.05 % cream; Apply 1 application topically to the appropriate area as directed 2 (Two) Times a Day. Apply to the vagina/vulva/perineum  Dispense: 30 g; Refill: 1  -     miconazole (MICOTIN) 2 % vaginal cream; Insert 1 applicator into the vagina Every Night. Dispense 7 applicators  Dispense: 45 g; Refill: 0    6. Vulvar candidiasis  -     NuSwab Vaginitis (VG) - Swab, Vagina  -     metroNIDAZOLE (Flagyl) 500 MG tablet; Take 1 tablet by mouth 2 (Two) Times a Day for 7 days.  Dispense: 14 tablet; Refill: 0  -     clobetasol (Temovate) 0.05 % cream; Apply 1 application topically to the appropriate area as directed 2 (Two) Times a Day. Apply to the vagina/vulva/perineum  Dispense: 30 g; Refill: 1  -     miconazole (MICOTIN) 2 % vaginal cream; Insert 1 applicator into the vagina Every Night. Dispense 7 applicators  Dispense: 45 g; Refill: 0    7. BV (bacterial vaginosis)  -     metroNIDAZOLE (Flagyl) 500 MG tablet; Take 1 tablet by mouth 2 (Two) Times a Day for 7 days.  Dispense: 14 tablet; Refill: 0       Exam with findings consistent with vulvar and vaginal candidiasis as well as a bacterial vaginosis infection.  Medications prescribed for her yeast infection including a topical steroid cream for her vulvar discomfort.  Instructed patient when inserting applicators to have the utmost care and inserting them into the vagina.  She expressed understanding of this.  I will have her return in a couple weeks to see how she is doing.  Pathology was reviewed with the patient.  She expressed understanding and her questions were answered.  She has follow-up with general surgery, Dr. Dykes, this afternoon.    Return in about 2 weeks (around 1/12/2023), or if symptoms worsen or fail to improve, for postop.         Leonardo Wodo MD  12/29/2022  13:55 CST

## 2022-12-31 LAB
A VAGINAE DNA VAG QL NAA+PROBE: NORMAL SCORE
BVAB2 DNA VAG QL NAA+PROBE: NORMAL SCORE
C ALBICANS DNA VAG QL NAA+PROBE: NEGATIVE
C GLABRATA DNA VAG QL NAA+PROBE: NEGATIVE
MEGA1 DNA VAG QL NAA+PROBE: NORMAL SCORE
T VAGINALIS DNA VAG QL NAA+PROBE: NEGATIVE

## 2023-01-05 ENCOUNTER — APPOINTMENT (OUTPATIENT)
Dept: ULTRASOUND IMAGING | Facility: HOSPITAL | Age: 63
End: 2023-01-05

## 2023-01-13 ENCOUNTER — OFFICE VISIT (OUTPATIENT)
Dept: OBSTETRICS AND GYNECOLOGY | Facility: CLINIC | Age: 63
End: 2023-01-13
Payer: COMMERCIAL

## 2023-01-13 VITALS
SYSTOLIC BLOOD PRESSURE: 124 MMHG | BODY MASS INDEX: 30.41 KG/M2 | HEIGHT: 66 IN | WEIGHT: 189.2 LBS | DIASTOLIC BLOOD PRESSURE: 82 MMHG

## 2023-01-13 DIAGNOSIS — N89.8 VAGINAL ITCHING: ICD-10-CM

## 2023-01-13 DIAGNOSIS — B37.31 VULVAR CANDIDIASIS: ICD-10-CM

## 2023-01-13 DIAGNOSIS — Z09 POSTOPERATIVE FOLLOW-UP: Primary | ICD-10-CM

## 2023-01-13 PROCEDURE — 99213 OFFICE O/P EST LOW 20 MIN: CPT | Performed by: OBSTETRICS & GYNECOLOGY

## 2023-01-13 PROCEDURE — 99024 POSTOP FOLLOW-UP VISIT: CPT | Performed by: OBSTETRICS & GYNECOLOGY

## 2023-01-13 RX ORDER — DOXYCYCLINE HYCLATE 100 MG/1
CAPSULE ORAL
COMMUNITY

## 2023-01-13 RX ORDER — METRONIDAZOLE 500 MG/1
TABLET ORAL
COMMUNITY

## 2023-01-13 RX ORDER — DOXYCYCLINE HYCLATE 100 MG/1
CAPSULE ORAL
COMMUNITY
Start: 2023-01-03

## 2023-01-13 RX ORDER — FLUCONAZOLE 150 MG/1
150 TABLET ORAL
Qty: 3 TABLET | Refills: 1 | Status: SHIPPED | OUTPATIENT
Start: 2023-01-13 | End: 2023-01-20

## 2023-01-13 RX ORDER — AZITHROMYCIN 250 MG/1
TABLET, FILM COATED ORAL
COMMUNITY
Start: 2023-01-05

## 2023-01-13 RX ORDER — AZITHROMYCIN 250 MG/1
TABLET, FILM COATED ORAL
COMMUNITY

## 2023-01-13 NOTE — PROGRESS NOTES
"    Leonardo Wood MD  [facility]  [address]  [place] [address]  Office [phone]  Fax [phone]      Subjective   Subjective     Yarely Cevallos is a 62 y.o. female who presents to the clinic 4 weeks Robot-assisted total laparoscopic hysterectomy with bilateral salpingo-oophorectomy, cystoscopy with oncology, status post inguinal hernia as well as periumbilical hernia repair by general surgery for Pelvic pain, left ovarian cyst.Eating a regular diet without difficulty. Bowel movements are normal. The patient is not having any pain.  Her main complaint still is vulvar and vaginal pruritus and discomfort. She reports improvement following diflucan, temovate, and miconazole but states symptoms relapsed following antibiotics for a URI.         Objective   Objective     Visit Vitals  /82   Ht 167.6 cm (66\")   Wt 85.8 kg (189 lb 3.2 oz)   BMI 30.54 kg/m²     Physical Exam  Vitals and nursing note reviewed. Exam conducted with a chaperone present.   Constitutional:       Appearance: Normal appearance.   HENT:      Head: Normocephalic and atraumatic.   Eyes:      General: No scleral icterus.     Conjunctiva/sclera: Conjunctivae normal.   Abdominal:      General: There is no distension.      Palpations: Abdomen is soft.      Tenderness: There is no abdominal tenderness. There is no guarding or rebound.      Comments: Incisions: clean and dry, appears to be healing well, no erythema/bleeding/discharge from incisions   Genitourinary:     General: Normal vulva.      Exam position: Lithotomy position.      Pubic Area: No rash or pubic lice.       Labia:         Right: No rash, tenderness or lesion.         Left: No rash, tenderness or lesion.       Urethra: No prolapse or urethral lesion.      Vagina: Normal.      Uterus: Absent.       Adnexa: Right adnexa normal and left adnexa normal.      Rectum: No external hemorrhoid.                Comments: Consent for exam obtained verbally from " patient.  Neurological:      Mental Status: She is alert.             Assessment & Plan   Assessment / Plan   Doing well postoperatively.  Operative findings again reviewed.   Pathology report discussed.  Incision care reviewed.  Activity restrictions reviewed: no heaving lifting >10 lbs for 2 more weeks; pelvic rest  Anticipated return to work: 1-2 weeks.  C/w temovate cream to vulva  Repeat miconazole to the vagina (she will pick this up at her pharmacy)  Restart diflucan.     Diagnoses and all orders for this visit:    1. Postoperative follow-up (Primary)    2. Vaginal itching    3. Vulvar candidiasis  -     fluconazole (Diflucan) 150 MG tablet; Take 1 tablet by mouth Every 72 (Seventy-Two) Hours for 3 doses.  Dispense: 3 tablet; Refill: 1         Return in about 2 weeks (around 1/27/2023) for postop.         Leonardo Wood MD  1/13/2023  10:58 CST

## 2023-04-26 ENCOUNTER — TELEPHONE (OUTPATIENT)
Dept: UROLOGY | Facility: CLINIC | Age: 63
End: 2023-04-26
Payer: COMMERCIAL

## 2023-04-26 NOTE — TELEPHONE ENCOUNTER
Called patient to ask if she wanted to set up her yearly appointment with Dequan Rowland.  Patient said that she didn't want to set up an appointment at this time, and would call us in the future if she wanted to be seen.  I told her I would make a note in her chart.

## 2023-09-05 NOTE — PROGRESS NOTES
Subjective    Ms. Cevallos is 62 y.o. female    Chief Complaint: Dysuria    History of Present Illness  Patient I have seen in the past that was referred for symptoms of urinary tract infection and trace blood.  I had had the patient get a CT scan at that time did not show any urinary tract pathology.  Shows 6 mm pulmonary nodule and I wanted her to get a CT of the chest.  Also a mass in the uterine fibroid most likely.  She is having worsening suprapubic and lower abdominal pain which she believes is urologic related she is not having any problems with bowel movements.  Not seen any gross hematuria.  Urine is clear today.    The following portions of the patient's history were reviewed and updated as appropriate: allergies, current medications, past family history, past medical history, past social history, past surgical history and problem list.    Review of Systems   Gastrointestinal:  Positive for abdominal pain.       Current Outpatient Medications:     acetaminophen (Tylenol) 325 MG tablet, Take 2 tablets by mouth Every 4 (Four) Hours As Needed for Mild Pain., Disp: 100 tablet, Rfl: 2    Atogepant (Qulipta) 30 MG tablet, 1 tablet Daily., Disp: , Rfl:     azithromycin (ZITHROMAX) 250 MG tablet, azithromycin 250 mg tablet, Disp: , Rfl:     azithromycin (ZITHROMAX) 250 MG tablet, , Disp: , Rfl:     citalopram (CeleXA) 10 MG tablet, , Disp: , Rfl:     clobetasol (Temovate) 0.05 % cream, Apply 1 application topically to the appropriate area as directed 2 (Two) Times a Day. Apply to the vagina/vulva/perineum, Disp: 30 g, Rfl: 1    diazePAM (VALIUM) 5 MG tablet, Take 1 tablet by mouth Every Night., Disp: , Rfl:     diphenhydrAMINE (BENADRYL) 25 mg capsule, Take 1 capsule by mouth Every 6 (Six) Hours As Needed for Sleep., Disp: , Rfl:     doxycycline (VIBRAMYCIN) 100 MG capsule, doxycycline hyclate 100 mg capsule, Disp: , Rfl:     fluconazole (DIFLUCAN) 150 MG tablet, fluconazole 150 mg tablet  Take 1 tablet by oral  route as directed for 2 days.  TAKE ONE TABLET BY MOUTH ONCE, REPEAT IN 3 DAYS IF SYMPTOMS PERSIST, Disp: , Rfl:     ibuprofen (ADVIL,MOTRIN) 600 MG tablet, Take 1 tablet by mouth Every 6 (Six) Hours As Needed for Moderate Pain or Mild Pain., Disp: 40 tablet, Rfl: 0    metroNIDAZOLE (FLAGYL) 500 MG tablet, metronidazole 500 mg tablet, Disp: , Rfl:     miconazole (MICOTIN) 2 % vaginal cream, Insert 1 applicator into the vagina Every Night. Dispense 7 applicators, Disp: 45 g, Rfl: 0    pantoprazole (PROTONIX) 40 MG EC tablet, pantoprazole 40 mg tablet,delayed release, Disp: , Rfl:     promethazine (PHENERGAN) 25 MG tablet, Take 1 tablet by mouth Daily As Needed for Nausea (migranies and for nausea due to meds)., Disp: , Rfl:     rizatriptan (MAXALT) 10 MG tablet, Take 1 tablet by mouth 1 (One) Time As Needed for Migraine. May repeat in 2 hours if needed, Disp: , Rfl:     Past Medical History:   Diagnosis Date    GERD (gastroesophageal reflux disease)     Kidney stone     1980    Migraines     PONV (postoperative nausea and vomiting)        Past Surgical History:   Procedure Laterality Date    INGUINAL HERNIA REPAIR Left 12/16/2022    Procedure: LEFT INGUINAL HERNIA REPAIR LAPAROSCOPIC WITH DAVINCI ROBOT WITH PRIMARY UMBILICAL HERNIA REPAIR - combo case with Dr. Wood;  Surgeon: Kisha Dykes MD;  Location: [facility] OR;  [facility];  Laterality: Left;    KIDNEY STONE SURGERY      PLANTAR FASCIA RELEASE Left 5/24/2022    Procedure: ENDOSCOPIC PLANTAR FASCIAL RELEASE, LEFT FOOT;  Surgeon: Timothy Lin DPM;  Location:  [facility] OR;  Service: Podiatry;  Laterality: Left;    TONSILLECTOMY      TOTAL LAPAROSCOPIC HYSTERECTOMY SALPINGO OOPHORECTOMY N/A 12/16/2022    Procedure: TOTAL LAPAROSCOPIC HYSTERECTOMY BILATERAL SALPINGOOPHORECTOMY WITH DAVINCI ROBOT, CYSTO;  Surgeon: Leonardo Wood MD;  Location:  [facility] OR;  Service: Robotics - [employer];  Laterality: N/A;    UMBILICAL HERNIA REPAIR N/A  "12/16/2022    Procedure: PRIMARY UMBILICAL HERNIA REPAIR - combo case with Dr. Wood;  Surgeon: Kisha Dykes MD;  Location: [facility] OR;  Service: Robotics - [employer];  Laterality: N/A;       Social History     Socioeconomic History    Marital status: Significant Other   Tobacco Use    Smoking status: Never    Smokeless tobacco: Never   Vaping Use    Vaping Use: Never used   Substance and Sexual Activity    Alcohol use: Never    Drug use: Never    Sexual activity: Not Currently       Family History   Problem Relation Age of Onset    Cancer Brother         bladder cancer       Objective    Temp 97.5 °F (36.4 °C)   Ht 167.6 cm (66\")   Wt 87.8 kg (193 lb 9.6 oz)   BMI 31.25 kg/m²     Physical Exam  Vitals reviewed.   Constitutional:       General: She is not in acute distress.     Appearance: Normal appearance. She is not toxic-appearing.   HENT:      Head: Normocephalic and atraumatic.   Pulmonary:      Effort: Pulmonary effort is normal.   Skin:     Coloration: Skin is not pale.   Neurological:      Mental Status: She is alert.   Psychiatric:         Mood and Affect: Mood normal.         Behavior: Behavior normal.           Results for orders placed or performed in visit on 09/11/23   POC Urinalysis Dipstick, Multipro    Specimen: Urine   Result Value Ref Range    Color Yellow Yellow, Straw, Dark Yellow, Sonja    Clarity, UA Clear Clear    Glucose, UA Negative Negative mg/dL    Bilirubin Negative Negative    Ketones, UA Negative Negative    Specific Gravity  1.025 1.005 - 1.030    Blood, UA Negative Negative    pH, Urine 5.5 5.0 - 8.0    Protein, POC Negative Negative mg/dL    Urobilinogen, UA Normal Normal, 0.2 E.U./dL    Nitrite, UA Negative Negative    Leukocytes Trace (A) Negative     Assessment and Plan    Diagnoses and all orders for this visit:    1. Suprapubic pain (Primary)  -     POC Urinalysis Dipstick, Multipro  -     CT Abdomen Pelvis With & Without Contrast; Future    2. Lower abdominal pain  - "     CT Abdomen Pelvis With & Without Contrast; Future    No UTI symptoms today or evidence of UTI and urinalysis also no evidence of hematuria.  Due to her continued lower abdominal pain I would like to have her get a CT urogram which should also show the 6 mm lung nodule.  I will review the results and the images and will get back with the patient regarding follow-up.

## 2023-09-11 ENCOUNTER — OFFICE VISIT (OUTPATIENT)
Dept: UROLOGY | Facility: CLINIC | Age: 63
End: 2023-09-11
Payer: COMMERCIAL

## 2023-09-11 VITALS — TEMPERATURE: 97.5 F | HEIGHT: 66 IN | BODY MASS INDEX: 31.12 KG/M2 | WEIGHT: 193.6 LBS

## 2023-09-11 DIAGNOSIS — R10.2 SUPRAPUBIC PAIN: Primary | ICD-10-CM

## 2023-09-11 DIAGNOSIS — R10.30 LOWER ABDOMINAL PAIN: ICD-10-CM

## 2023-09-11 LAB
BILIRUB BLD-MCNC: NEGATIVE MG/DL
CLARITY, POC: CLEAR
COLOR UR: YELLOW
GLUCOSE UR STRIP-MCNC: NEGATIVE MG/DL
KETONES UR QL: NEGATIVE
LEUKOCYTE EST, POC: ABNORMAL
NITRITE UR-MCNC: NEGATIVE MG/ML
PH UR: 5.5 [PH] (ref 5–8)
PROT UR STRIP-MCNC: NEGATIVE MG/DL
RBC # UR STRIP: NEGATIVE /UL
SP GR UR: 1.02 (ref 1–1.03)
UROBILINOGEN UR QL: NORMAL

## 2023-09-11 PROCEDURE — 81001 URINALYSIS AUTO W/SCOPE: CPT | Performed by: PHYSICIAN ASSISTANT

## 2023-09-11 PROCEDURE — 99214 OFFICE O/P EST MOD 30 MIN: CPT | Performed by: PHYSICIAN ASSISTANT

## 2023-09-11 RX ORDER — CITALOPRAM HYDROBROMIDE 10 MG/1
TABLET ORAL
COMMUNITY
Start: 2023-09-05

## 2023-09-15 ENCOUNTER — HOSPITAL ENCOUNTER (OUTPATIENT)
Dept: CT IMAGING | Facility: HOSPITAL | Age: 63
Discharge: HOME | End: 2023-09-15
Admitting: PHYSICIAN ASSISTANT
Payer: COMMERCIAL

## 2023-09-15 ENCOUNTER — TELEPHONE (OUTPATIENT)
Dept: UROLOGY | Facility: CLINIC | Age: 63
End: 2023-09-15
Payer: COMMERCIAL

## 2023-09-15 DIAGNOSIS — R10.30 LOWER ABDOMINAL PAIN: ICD-10-CM

## 2023-09-15 DIAGNOSIS — R10.2 SUPRAPUBIC PAIN: ICD-10-CM

## 2023-09-15 LAB — CREAT BLDA-MCNC: 1 MG/DL (ref 0.6–1.3)

## 2023-09-15 PROCEDURE — 74178 CT ABD&PLV WO CNTR FLWD CNTR: CPT

## 2023-09-15 PROCEDURE — 82565 ASSAY OF CREATININE: CPT

## 2023-09-15 PROCEDURE — 25510000001 IOPAMIDOL PER 1 ML: Performed by: PHYSICIAN ASSISTANT

## 2023-09-15 RX ADMIN — IOPAMIDOL 100 ML: 755 INJECTION, SOLUTION INTRAVENOUS at 08:54

## 2023-09-15 NOTE — TELEPHONE ENCOUNTER
Called pt with results- she v/u    ----- Message from TOMI Gonzales sent at 9/15/2023 10:34 AM CDT -----  Regarding: CT  I reviewed the CT scan and there are no acute abnormalities noted of the urinary tract or any other solid organ.  There is a right lower pole lung nodule measured at approximately 7 mm is unchanged from previous imaging.  ----- Message -----  From: Interface, Rad Results [place] In  Sent: 9/15/2023   9:54 AM CDT  To: TOMI Gonzales

## 2023-09-15 NOTE — TELEPHONE ENCOUNTER
Caller: Yarely Cevallos    Relationship to patient: Self    Best call back number: [phone]     Patient is needing: RECEIVED A CALL FROM PT. SHE HAD ANOTHER QUESTION ABOUT THE CT ABD PELVIS. SHE WANTS TO KNOW IF THE CT SCAN WOULD SHOW IF SHE HAD A HERNIA. OFFICE PLEASE ADVISE.THANK YOU.

## 2023-12-20 PROCEDURE — 93246 EXT ECG>7D<15D RECORDING: CPT | Performed by: NURSE PRACTITIONER

## 2024-01-15 DIAGNOSIS — R00.1 BRADYCARDIA: Primary | ICD-10-CM

## 2024-01-15 PROCEDURE — 93248 EXT ECG>7D<15D REV&INTERPJ: CPT | Performed by: NURSE PRACTITIONER

## 2024-01-17 ENCOUNTER — OFFICE VISIT (OUTPATIENT)
Dept: CARDIOLOGY CLINIC | Age: 64
End: 2024-01-17
Payer: COMMERCIAL

## 2024-01-17 VITALS
DIASTOLIC BLOOD PRESSURE: 66 MMHG | HEIGHT: 66 IN | SYSTOLIC BLOOD PRESSURE: 118 MMHG | BODY MASS INDEX: 29.09 KG/M2 | HEART RATE: 72 BPM | WEIGHT: 181 LBS

## 2024-01-17 DIAGNOSIS — R00.1 BRADYCARDIA: ICD-10-CM

## 2024-01-17 DIAGNOSIS — Z82.49 FAMILY HISTORY OF EARLY CAD: ICD-10-CM

## 2024-01-17 DIAGNOSIS — E78.5 DYSLIPIDEMIA: ICD-10-CM

## 2024-01-17 DIAGNOSIS — R00.1 BRADYCARDIA: Primary | ICD-10-CM

## 2024-01-17 LAB
ALBUMIN SERPL-MCNC: 4.6 G/DL (ref 3.5–5.2)
ALP SERPL-CCNC: 69 U/L (ref 35–104)
ALT SERPL-CCNC: 15 U/L (ref 5–33)
ANION GAP SERPL CALCULATED.3IONS-SCNC: 12 MMOL/L (ref 7–19)
AST SERPL-CCNC: 18 U/L (ref 5–32)
BASOPHILS # BLD: 0 K/UL (ref 0–0.2)
BASOPHILS NFR BLD: 0.5 % (ref 0–1)
BILIRUB SERPL-MCNC: <0.2 MG/DL (ref 0.2–1.2)
BUN SERPL-MCNC: 14 MG/DL (ref 8–23)
CALCIUM SERPL-MCNC: 9.9 MG/DL (ref 8.8–10.2)
CHLORIDE SERPL-SCNC: 103 MMOL/L (ref 98–111)
CHOLEST SERPL-MCNC: 290 MG/DL (ref 160–199)
CO2 SERPL-SCNC: 26 MMOL/L (ref 22–29)
CREAT SERPL-MCNC: 0.7 MG/DL (ref 0.5–0.9)
EOSINOPHIL # BLD: 0.1 K/UL (ref 0–0.6)
EOSINOPHIL NFR BLD: 1.1 % (ref 0–5)
ERYTHROCYTE [DISTWIDTH] IN BLOOD BY AUTOMATED COUNT: 13.7 % (ref 11.5–14.5)
GLUCOSE SERPL-MCNC: 94 MG/DL (ref 74–109)
HCT VFR BLD AUTO: 45.1 % (ref 37–47)
HDLC SERPL-MCNC: 68 MG/DL (ref 65–121)
HGB BLD-MCNC: 14.4 G/DL (ref 12–16)
IMM GRANULOCYTES # BLD: 0 K/UL
LDLC SERPL CALC-MCNC: 198 MG/DL
LYMPHOCYTES # BLD: 2.2 K/UL (ref 1.1–4.5)
LYMPHOCYTES NFR BLD: 38.5 % (ref 20–40)
MCH RBC QN AUTO: 28.3 PG (ref 27–31)
MCHC RBC AUTO-ENTMCNC: 31.9 G/DL (ref 33–37)
MCV RBC AUTO: 88.8 FL (ref 81–99)
MONOCYTES # BLD: 0.5 K/UL (ref 0–0.9)
MONOCYTES NFR BLD: 8.5 % (ref 0–10)
NEUTROPHILS # BLD: 2.9 K/UL (ref 1.5–7.5)
NEUTS SEG NFR BLD: 51.2 % (ref 50–65)
PLATELET # BLD AUTO: 307 K/UL (ref 130–400)
PMV BLD AUTO: 9.4 FL (ref 9.4–12.3)
POTASSIUM SERPL-SCNC: 4.2 MMOL/L (ref 3.5–5)
PROT SERPL-MCNC: 7.2 G/DL (ref 6.6–8.7)
RBC # BLD AUTO: 5.08 M/UL (ref 4.2–5.4)
SODIUM SERPL-SCNC: 141 MMOL/L (ref 136–145)
TRIGL SERPL-MCNC: 121 MG/DL (ref 0–149)
TSH SERPL DL<=0.005 MIU/L-ACNC: 1.06 UIU/ML (ref 0.27–4.2)
WBC # BLD AUTO: 5.7 K/UL (ref 4.8–10.8)

## 2024-01-17 PROCEDURE — 99214 OFFICE O/P EST MOD 30 MIN: CPT | Performed by: NURSE PRACTITIONER

## 2024-01-17 RX ORDER — ATOGEPANT 30 MG/1
30 TABLET ORAL DAILY
COMMUNITY
Start: 2023-05-01

## 2024-01-17 NOTE — PATIENT INSTRUCTIONS
Mobile at the [facility] located on the first floor of [facility].   Enter through hospital main entrance and turn immediately to your left.    Date/Time:     Patient's contact number:  [phone] (home)     Echocardiogram -  No prep.      Takes approximately 30 min.    An echocardiogram uses sound waves to produce images of your heart. This commonly used test allows your doctor to see how your heart is beating and pumping blood. Your doctor can use the images from an echocardiogram to identify various abnormalities in the heart muscle and valves.    This test has 2 parts:   You will be asked to disrobe from the waist up and given a gown to wear. The technologist will then hook up an EKG monitor to you for the entire exam.   You will then have an ultrasound of your heart (echocardiogram) to assess the heart muscle, heart valves and heart function.     You may eat and take any medicines before the exam.     If you need to change your appointment, please call outpatient scheduling at [phone].

## 2024-01-17 NOTE — PROGRESS NOTES
[facility] Cardiology   Established Patient Office Visit  [address]  [address]  [facility] [id number] [phone]        OFFICE VISIT:  2024    Isabella Hodgson - : 1960    Reason For Visit:  Isabella is a 63 y.o. female who is here for Results    1. Bradycardia    2. Family history of early CAD        Patient with a negative family history of coronary artery disease.  Non-smoker.  Recently been found to have elevated lipid profile.  She is trying 3-month lifestyle modification.  She has lost 16 pounds.  Patient has been monitoring blood pressure and heart rate with a pulse ox at home.  She did discuss with her PCP that she has been getting some readings in the 30s on her pulse ox.  She denies any lightheadedness dizziness or syncope.  There hasbeen no chest pain, pressure or tightness.  There is no shortness of breath, orthopnea or PND.  She states occasionally she does feel palpitations.     Patient states she does have whitecoat syndrome.  Blood pressure log shows well-controlled readings    14-day rhythm*was placed.    Sinus rhythm with a minimum heart rate of 43 bpm.  Maximum 131 bpm.  Averaging 74 bpm.  2 SVT runs longest being 4 beats and fastest being 174 bpm.  There was no A-fib, VT, pauses or high degree AV blocks noted.    Patient presents to clinic today for results of the aforementioned test.  No current complaints      Subjective    Isabella Hodgson is a 63 y.o. female [email or web address]    Patient Active Problem List    Diagnosis Date Noted    Dizziness of unknown etiology 2021     Current Outpatient Medications   Medication Sig Dispense Refill    Atogepant (QULIPTA) 30 MG TABS Take 30 mg by mouth daily      Probiotic Product (PROBIOTIC-10 PO) Take by mouth      diphenhydrAMINE (BENADRYL ALLERGY) 25 MG capsule Take 1 capsule by mouth every 6 hours as needed for Itching      Cholecalciferol (VITAMIN D3) 1.25 MG (87333 UT) CAPS Take by mouth

## 2024-01-18 DIAGNOSIS — E78.5 DYSLIPIDEMIA: Primary | ICD-10-CM

## 2024-01-18 RX ORDER — ATORVASTATIN CALCIUM 20 MG/1
20 TABLET, FILM COATED ORAL DAILY
Qty: 90 TABLET | Refills: 3 | Status: SHIPPED | OUTPATIENT
Start: 2024-01-18

## 2024-01-18 ASSESSMENT — ENCOUNTER SYMPTOMS
SORE THROAT: 0
CHEST TIGHTNESS: 0
COUGH: 0
SHORTNESS OF BREATH: 0
WHEEZING: 0

## 2024-01-31 ENCOUNTER — HOSPITAL ENCOUNTER (OUTPATIENT)
Dept: NON INVASIVE DIAGNOSTICS | Age: 64
Discharge: HOME | End: 2024-01-31
Payer: COMMERCIAL

## 2024-01-31 PROCEDURE — 6360000004 HC RX CONTRAST MEDICATION: Performed by: NURSE PRACTITIONER

## 2024-01-31 PROCEDURE — C8929 TTE W OR WO FOL WCON,DOPPLER: HCPCS

## 2024-01-31 RX ADMIN — PERFLUTREN 1.5 ML: 6.52 INJECTION, SUSPENSION INTRAVENOUS at 14:01

## 2024-04-11 DIAGNOSIS — E78.5 DYSLIPIDEMIA: ICD-10-CM

## 2024-04-11 LAB
CHOLEST SERPL-MCNC: 272 MG/DL (ref 160–199)
HDLC SERPL-MCNC: 77 MG/DL (ref 65–121)
LDLC SERPL CALC-MCNC: 178 MG/DL
TRIGL SERPL-MCNC: 87 MG/DL (ref 0–149)

## 2024-04-11 RX ORDER — PRAVASTATIN SODIUM 20 MG
20 TABLET ORAL DAILY
Qty: 90 TABLET | Refills: 1 | Status: SHIPPED | OUTPATIENT
Start: 2024-04-11 | End: 2024-04-11 | Stop reason: CLARIF

## 2024-04-15 DIAGNOSIS — E78.5 DYSLIPIDEMIA: Primary | ICD-10-CM

## 2024-04-15 RX ORDER — EVOLOCUMAB 140 MG/ML
140 INJECTION, SOLUTION SUBCUTANEOUS
Qty: 2.1 ML | Refills: 11 | Status: SHIPPED | OUTPATIENT
Start: 2024-04-15

## 2024-04-15 RX ORDER — EVOLOCUMAB 140 MG/ML
1 INJECTION, SOLUTION SUBCUTANEOUS
COMMUNITY
End: 2024-04-15 | Stop reason: SDUPTHER

## 2024-04-18 ENCOUNTER — OFFICE VISIT (OUTPATIENT)
Dept: CARDIOLOGY CLINIC | Age: 64
End: 2024-04-18
Payer: COMMERCIAL

## 2024-04-18 VITALS
WEIGHT: 173 LBS | DIASTOLIC BLOOD PRESSURE: 80 MMHG | SYSTOLIC BLOOD PRESSURE: 130 MMHG | HEART RATE: 74 BPM | HEIGHT: 66 IN | OXYGEN SATURATION: 97 % | BODY MASS INDEX: 27.8 KG/M2

## 2024-04-18 DIAGNOSIS — R00.1 BRADYCARDIA: Primary | ICD-10-CM

## 2024-04-18 DIAGNOSIS — E78.5 DYSLIPIDEMIA: ICD-10-CM

## 2024-04-18 PROCEDURE — 99214 OFFICE O/P EST MOD 30 MIN: CPT | Performed by: NURSE PRACTITIONER

## 2024-04-18 RX ORDER — M-VIT,TX,IRON,MINS/CALC/FOLIC 27MG-0.4MG
1 TABLET ORAL DAILY
COMMUNITY

## 2024-04-18 RX ORDER — EZETIMIBE 10 MG/1
10 TABLET ORAL DAILY
Qty: 90 TABLET | Refills: 3 | Status: SHIPPED | OUTPATIENT
Start: 2024-04-18

## 2024-04-18 ASSESSMENT — ENCOUNTER SYMPTOMS
COUGH: 0
SHORTNESS OF BREATH: 0
WHEEZING: 0
SORE THROAT: 0
CHEST TIGHTNESS: 0

## 2024-04-18 NOTE — PROGRESS NOTES
Will recheck in 2 months. Yes Continue current medications:     Yes           2. Bradycardia  Stable   No further complaints asymptomatic No Continue current medications:    NA                                     Orders Placed This Encounter   Procedures    Lipid, Fasting     Standing Status:   Future     Standing Expiration Date:   4/18/2025     Orders Placed This Encounter   Medications    ezetimibe (ZETIA) 10 MG tablet     Sig: Take 1 tablet by mouth daily     Dispense:  90 tablet     Refill:  3       Discussed with patient and .    Return in about 2 months (around 6/18/2024) for Follow up with Ana .    I greatly appreciate the opportunity to meet Isabella Hodgson and your confidence in allowing me to participate in her cardiovascular care.    BLANCHE Jacinto NP  4/18/2024 1:27 PM CDT                    This dictation was generated by voice recognition computer software. Although all attempts are made to edit dictation for accuracy, there may be errors in the transcription that are not intended.

## 2024-07-11 ENCOUNTER — TELEPHONE (OUTPATIENT)
Dept: CARDIOLOGY CLINIC | Age: 64
End: 2024-07-11

## 2024-07-11 NOTE — TELEPHONE ENCOUNTER
Patient called in to advise that she had to stop her zetia due to it causing severe diarrhea.  Patient states she has now tried statin and the zetia with no success and is wondering if she can pursue repatha?

## 2024-07-11 NOTE — TELEPHONE ENCOUNTER
Insurance denied this the first time due to not medically necessary, I will resubmit and appeal if I need too.

## 2024-07-15 DIAGNOSIS — E78.5 DYSLIPIDEMIA: ICD-10-CM

## 2024-07-15 RX ORDER — EVOLOCUMAB 140 MG/ML
140 INJECTION, SOLUTION SUBCUTANEOUS
Qty: 2.1 ML | Refills: 11 | Status: SHIPPED | OUTPATIENT
Start: 2024-07-15

## 2024-07-15 NOTE — TELEPHONE ENCOUNTER
Acute Care - Physical Therapy Treatment Note  [facility]     Patient Name: Nicanor Haley  : 1948  MRN: [id number]  Today's Date: 10/17/2018             Admit Date: 10/15/2018    Visit Dx:    ICD-10-CM ICD-9-CM   1. Closed fracture of proximal end of left fibula, unspecified fracture morphology, initial encounter S82.832A 823.01   2. Immobility syndrome M62.3 728.3   3. Fall in home, initial encounter W19.XXXA E888.9    Y92.009 E849.0   4. Difficulty walking R26.2 719.7     Patient Active Problem List   Diagnosis   • History of colon polyps   • Acute pancreatitis due to calculus of common bile duct   • Abdominal pain, generalized   • Closed left tibial fracture   • Closed bimalleolar fracture of left ankle   • Left tibial fracture   • Closed fracture of upper end of left fibula   • Immobility syndrome       Therapy Treatment          Rehabilitation Treatment Summary     Row Name 10/17/18 1500             Treatment Time/Intention    Discipline physical therapy assistant  -CW      Document Type therapy note (daily note)  -CW      Subjective Information complains of;weakness;fatigue;pain  -CW      Therapy Frequency (PT Clinical Impression) daily  -CW      Patient Effort adequate  -CW      Existing Precautions/Restrictions fall  -CW      Recorded by [CW] Johnson Oates P, PTA 10/17/18 151      Row Name 10/17/18 1500             Vital Signs    O2 Delivery Pre Treatment room air  -CW      Recorded by [CW] Johnson Oates P, PTA 10/17/18 151      Row Name 10/17/18 1500             Cognitive Assessment/Intervention- PT/OT    Orientation Status (Cognition) oriented x 3  -CW      Follows Commands (Cognition) WNL  -CW      Personal Safety Interventions fall prevention program maintained;gait belt;muscle strengthening facilitated;nonskid shoes/slippers when out of bed  -CW      Recorded by [CW] Johnson Oates P, PTA 10/17/18 151      Row Name 10/17/18 1500             Mobility Assessment/Intervention     Appealed patients repatha and she was approved.    Extremity Weight-bearing Status left lower extremity  -CW      Left Lower Extremity (Weight-bearing Status) weight-bearing as tolerated (WBAT)  -CW      Recorded by [CW] Johnson Oatse, PTA 10/17/18 1516      Row Name 10/17/18 1500             Bed Mobility Assessment/Treatment    Bed Mobility Assessment/Treatment supine-sit;sit-supine  -CW      Supine-Sit [place] (Bed Mobility) supervision  -CW      Sit-Supine [place] (Bed Mobility) supervision  -CW      Assistive Device (Bed Mobility) bed rails;head of bed elevated  -CW      Recorded by [CW] Johnson Oates, PTA 10/17/18 1516      Row Name 10/17/18 1500             Transfer Assessment/Treatment    Transfer Assessment/Treatment sit-stand transfer;stand-sit transfer  -CW      Comment (Transfers) Attempted 3x unable to stand with Max A  -CW      Recorded by [CW] Johnson Oates, PTA 10/17/18 1516      Row Name 10/17/18 1500             Therapeutic Exercise    Lower Extremity (Therapeutic Exercise) gluteal sets;LAQ (long arc quad), bilateral;marching while seated  -CW      Lower Extremity Range of Motion (Therapeutic Exercise) ankle dorsiflexion/plantar flexion, bilateral  -CW      Exercise Type (Therapeutic Exercise) AROM (active range of motion)  -CW      Position (Therapeutic Exercise) seated  -CW      Sets/Reps (Therapeutic Exercise) 10  -CW      Recorded by [CW] Johnson Oates, PTA 10/17/18 1516      Row Name 10/17/18 1500             Positioning and Restraints    Pre-Treatment Position in bed  -CW      Post Treatment Position bed  -CW      In Bed notified nsg;supine;call light within reach;encouraged to call for assist  -CW      Recorded by [CW] Johnson Oates, PTA 10/17/18 1516      Row Name 10/17/18 1500             Outcome Summary/Treatment Plan (PT)    Anticipated Discharge Disposition (PT) skilled nursing facility  -CW      Recorded by [CW] Johnson Oates, PTA 10/17/18 1516        User Key  (r) = Recorded By, (t) = Taken  By, (c) = Cosigned By    Initials Name Effective Dates Discipline     Johnson Oates PTA 03/07/18 -  PT                     Physical Therapy Education     Title: PT OT SLP Therapies (Done)     Topic: Physical Therapy (Done)     Point: Mobility training (Done)    Learning Progress Summary     Learner Status Readiness Method Response Comment Documented by    Patient Done Acceptance E,TB VU,DU   10/17/18 1517     Done Acceptance E,D VU,NR   10/16/18 1653          Point: Home exercise program (Done)    Learning Progress Summary     Learner Status Readiness Method Response Comment Documented by    Patient Done Acceptance E,TB VU,DU   10/17/18 1517     Done Acceptance E,D VU,NR   10/16/18 1653          Point: Body mechanics (Done)    Learning Progress Summary     Learner Status Readiness Method Response Comment Documented by    Patient Done Acceptance E,TB VU,DU   10/17/18 1517     Done Acceptance E,D VU,NR   10/16/18 1653          Point: Precautions (Done)    Learning Progress Summary     Learner Status Readiness Method Response Comment Documented by    Patient Done Acceptance E,TB VU,DU   10/17/18 1517     Done Acceptance E,D VU,NR   10/16/18 1653                      User Key     Initials Effective Dates Name Provider Type Discipline     06/08/18 -  Ayana Noriega, PT Physical Therapist PT     03/07/18 -  Johnson Oates PTA Physical Therapy Assistant PT                    PT Recommendation and Plan  Anticipated Discharge Disposition (PT): skilled nursing facility  Therapy Frequency (PT Clinical Impression): daily  Outcome Summary/Treatment Plan (PT)  Anticipated Discharge Disposition (PT): skilled nursing facility  Plan of Care Reviewed With: patient  Progress: no change  Outcome Summary: pt unable to stand upright or clear bottom of bed today.  Pt improved with activity tolerance and sitting balance as well as bed mobility          Outcome Measures     Row Name 10/17/18 1500  10/16/18 1600          How much help from another person do you currently need...    Turning from your back to your side while in flat bed without using bedrails? 3  -CW 3  -KH     Moving from lying on back to sitting on the side of a flat bed without bedrails? 3  -CW 3  -KH     Moving to and from a bed to a chair (including a wheelchair)? 1  -CW 2  -KH     Standing up from a chair using your arms (e.g., wheelchair, bedside chair)? 1  -CW 2  -KH     Climbing 3-5 steps with a railing? 1  -CW 1  -KH     To walk in hospital room? 1  -CW 1  -KH     AM-PAC 6 Clicks Score 10  -CW 12  -KH        Functional Assessment    Outcome Measure Options AM-PAC 6 Clicks Basic Mobility (PT)  -CW AM-PAC 6 Clicks Basic Mobility (PT)  -KH       User Key  (r) = Recorded By, (t) = Taken By, (c) = Cosigned By    Initials Name Provider Type    Ayana Cody, PT Physical Therapist    Johnson Moreno PTA Physical Therapy Assistant           Time Calculation:         PT Charges     Row Name 10/17/18 1518             Time Calculation    Start Time 1500  -CW      Stop Time 1518  -CW      Time Calculation (min) 18 min  -CW      PT Received On 10/17/18  -CW      PT - Next Appointment 10/18/18  -CW        User Key  (r) = Recorded By, (t) = Taken By, (c) = Cosigned By    Initials Name Provider Type    Johnson Moreno PTA Physical Therapy Assistant        Therapy Suggested Charges     Code   Minutes Charges    None           Therapy Charges for Today     Code Description Service Date Service Provider Modifiers Qty    55225578423 HC PT THER PROC EA 15 MIN 10/17/2018 Johnson Oates PTA GP 1          PT G-Codes  Outcome Measure Options: AM-PAC 6 Clicks Basic Mobility (PT)  AM-PAC 6 Clicks Score: 10    Johnson Oates PTA  10/17/2018

## 2024-07-15 NOTE — TELEPHONE ENCOUNTER
I have appealed patients repatha, she will  tomorrow at [facility] in [place]. Please send to her pharamcy.

## 2024-10-15 ENCOUNTER — OFFICE VISIT (OUTPATIENT)
Dept: CARDIOLOGY CLINIC | Age: 64
End: 2024-10-15
Payer: COMMERCIAL

## 2024-10-15 VITALS
HEART RATE: 73 BPM | DIASTOLIC BLOOD PRESSURE: 78 MMHG | OXYGEN SATURATION: 98 % | BODY MASS INDEX: 30.22 KG/M2 | WEIGHT: 188 LBS | SYSTOLIC BLOOD PRESSURE: 126 MMHG | HEIGHT: 66 IN

## 2024-10-15 DIAGNOSIS — I10 ESSENTIAL HYPERTENSION: Primary | ICD-10-CM

## 2024-10-15 DIAGNOSIS — E78.5 DYSLIPIDEMIA: ICD-10-CM

## 2024-10-15 PROCEDURE — 3074F SYST BP LT 130 MM HG: CPT | Performed by: NURSE PRACTITIONER

## 2024-10-15 PROCEDURE — 99214 OFFICE O/P EST MOD 30 MIN: CPT | Performed by: NURSE PRACTITIONER

## 2024-10-15 PROCEDURE — 3078F DIAST BP <80 MM HG: CPT | Performed by: NURSE PRACTITIONER

## 2024-10-15 RX ORDER — PROMETHAZINE HYDROCHLORIDE 25 MG/1
25 TABLET ORAL EVERY 6 HOURS PRN
COMMUNITY
Start: 2024-08-26

## 2024-10-15 ASSESSMENT — ENCOUNTER SYMPTOMS
SORE THROAT: 0
SHORTNESS OF BREATH: 0
COUGH: 0
WHEEZING: 0
CHEST TIGHTNESS: 0

## 2024-10-15 NOTE — PROGRESS NOTES
[facility] Cardiology   Established Patient Office Visit  [address]  [address]  [facility] [address]  [phone]        OFFICE VISIT:  10/15/2024    Isabella Hodgson - : 1960    Reason For Visit:  Isabella is a 63 y.o. female who is here for Follow-up    1. Essential hypertension    2. Dyslipidemia        Patient with a negative family history of coronary artery disease.  Non-smoker.      Patient was started on Repatha   Lipid profile on 2024 showed  Total cholesterol 272.  Triglyceride 87.  HDL 77.  .  LDL improved.  Patient never got the Repatha.  Was not yet approved.  Patient had lost 23 pounds since November.  She was trying very hard with her lifestyle modifications to bring down those levels.  Still do not feel comfortable with an LDL of 178.  prescription for Zetia.  She is unable to tolerate any statins due to severe muscle cramping.    Patient presents to clinic today for follow-up.  Patient had her fasting lipid profile done on 10/9/2024.  Total cholesterol 178.  Triglycerides 137.  HDL 75.  LDL 80.  Patient was unable to do tolerate Zetia.  We were able to get her Repatha.    She denies any chest pain, pressure or tightness.  There is no shortness of breath, orthopnea or PND.  Patient denies any lightheadedness, dizziness or syncope.        DATA:  14-day rhythm*     Sinus rhythm with a minimum heart rate of 43 bpm.  Maximum 131 bpm.  Averaging 74 bpm.  2 SVT runs longest being 4 beats and fastest being 174 bpm.  There was no A-fib, VT, pauses or high degree AV blocks noted.     2 D ECHO      Mildly thickened mitral valve with normal leaflet mobility. No evidence of   mitral valve stenosis. Trivial mitral regurgitation.   Aortic valve appears to be tricuspid.   Structurally normal aortic valve.   No significant aortic regurgitation or stenosis is noted.   Tricuspid valve is structurally normal.   No evidence of tricuspid regurgitation.   Normal left ventricular size with preserved LV

## 2025-03-18 ENCOUNTER — TELEPHONE (OUTPATIENT)
Dept: CARDIOLOGY CLINIC | Age: 65
End: 2025-03-18

## 2025-03-18 NOTE — TELEPHONE ENCOUNTER
Patient called to see if any labs are needed prior to next appt. Advised that Ana has ordered a lipid panel dated 4/18/2024 that she can do before it expires. Advised order can be mailed to her and will be done today. She voiced understanding.

## 2025-04-16 DIAGNOSIS — E78.5 DYSLIPIDEMIA: ICD-10-CM

## 2025-04-17 ENCOUNTER — OFFICE VISIT (OUTPATIENT)
Dept: CARDIOLOGY CLINIC | Age: 65
End: 2025-04-17
Payer: COMMERCIAL

## 2025-04-17 VITALS
DIASTOLIC BLOOD PRESSURE: 60 MMHG | HEIGHT: 66 IN | OXYGEN SATURATION: 96 % | HEART RATE: 92 BPM | SYSTOLIC BLOOD PRESSURE: 120 MMHG | WEIGHT: 187 LBS | BODY MASS INDEX: 30.05 KG/M2

## 2025-04-17 DIAGNOSIS — E78.5 DYSLIPIDEMIA: ICD-10-CM

## 2025-04-17 DIAGNOSIS — I10 ESSENTIAL HYPERTENSION: ICD-10-CM

## 2025-04-17 DIAGNOSIS — R00.1 BRADYCARDIA: Primary | ICD-10-CM

## 2025-04-17 PROCEDURE — 93000 ELECTROCARDIOGRAM COMPLETE: CPT | Performed by: NURSE PRACTITIONER

## 2025-04-17 PROCEDURE — 99214 OFFICE O/P EST MOD 30 MIN: CPT | Performed by: NURSE PRACTITIONER

## 2025-04-17 PROCEDURE — 3078F DIAST BP <80 MM HG: CPT | Performed by: NURSE PRACTITIONER

## 2025-04-17 PROCEDURE — 3074F SYST BP LT 130 MM HG: CPT | Performed by: NURSE PRACTITIONER

## 2025-04-17 ASSESSMENT — ENCOUNTER SYMPTOMS
WHEEZING: 0
SORE THROAT: 0
COUGH: 0
CHEST TIGHTNESS: 0
SHORTNESS OF BREATH: 0

## 2025-04-17 NOTE — PROGRESS NOTES
[facility] Cardiology   Established Patient Office Visit  [address]  [address]  [facility] [id number] [phone]        OFFICE VISIT:  2025    Isabella Hodgson - : 1960    Reason For Visit:  Isabella is a 64 y.o. female who is here for 6 Month Follow-Up and Hypertension    1. Bradycardia    2. Essential hypertension    3. Dyslipidemia        Patient with a negative family history of coronary artery disease.  Non-smoker.  She has a history of hypertension and dyslipidemia.  Statin intolerant.    Patient presents to clinic today for 6-month follow-up.  Lipid profile 4/10/2025.  Total cholesterol 168.  Triglycerides 117.  HDL 70.  LDL 75.    Patient denies any chest pain, pressure or tightness.  There is no shortness of breath, orthopnea or PND.  Patient denies any lightheadedness, dizziness or syncope.      DATA:  14-day rhythm*     Sinus rhythm with a minimum heart rate of 43 bpm.  Maximum 131 bpm.  Averaging 74 bpm.  2 SVT runs longest being 4 beats and fastest being 174 bpm.  There was no A-fib, VT, pauses or high degree AV blocks noted.     2 D ECHO      Mildly thickened mitral valve with normal leaflet mobility. No evidence of   mitral valve stenosis. Trivial mitral regurgitation.   Aortic valve appears to be tricuspid.   Structurally normal aortic valve.   No significant aortic regurgitation or stenosis is noted.   Tricuspid valve is structurally normal.   No evidence of tricuspid regurgitation.   Normal left ventricular size with preserved LV function and an estimated   ejection fraction of approximately 55-60%.   Impaired relaxation compatible with diastolic dysfunction. ( reversed E/A   ratio)   No evidence of left ventricular mass or thrombus noted.   No regional wall motion abnormalities.      Signature      ----------------------------------------------------------------   Electronically signed by Jama Gomez MD(Interpreting   physician) on 2024 03:57 PM

## 2025-05-08 ENCOUNTER — TELEPHONE (OUTPATIENT)
Dept: FAMILY MEDICINE CLINIC | Facility: CLINIC | Age: 65
End: 2025-05-08
Payer: COMMERCIAL

## 2025-05-08 RX ORDER — RIZATRIPTAN BENZOATE 10 MG/1
10 TABLET ORAL ONCE AS NEEDED
Qty: 16 TABLET | Refills: 1 | Status: SHIPPED | OUTPATIENT
Start: 2025-05-08 | End: 2025-05-24

## 2025-05-08 NOTE — TELEPHONE ENCOUNTER
Hub staff attempted to follow warm transfer process and was unsuccessful     Caller: Yarely Cevallos    Relationship to patient: Self    Best call back number:     [phone]     Patient is needing: NEEDS TO MAKE AN APPT WITH JUAN WILLIS. USED TO SEE HIM IN [place].

## 2025-05-08 NOTE — TELEPHONE ENCOUNTER
Pt called and made an appt to see Rodolfo Jeovanny on June 3rd. Pt states she will run out of her Rizatriptan on May 29th and will need refills on these sent to [facility] pharmacy in [place]. Please advise.

## 2025-05-13 ENCOUNTER — TELEPHONE (OUTPATIENT)
Dept: FAMILY MEDICINE CLINIC | Facility: CLINIC | Age: 65
End: 2025-05-13

## 2025-05-13 RX ORDER — RIZATRIPTAN BENZOATE 10 MG/1
10 TABLET ORAL ONCE AS NEEDED
Qty: 16 TABLET | Refills: 1 | Status: SHIPPED | OUTPATIENT
Start: 2025-05-13 | End: 2025-06-03 | Stop reason: SDUPTHER

## 2025-05-13 NOTE — TELEPHONE ENCOUNTER
Caller: Yarely Cevallos    Relationship: Self    Best call back number: [phone]     Who are you requesting to speak with (clinical staff, provider,  specific staff member): CLINICAL    What was the call regarding: PATIENT'S rizatriptan (MAXALT) 10 MG tablet WAS SENT TO THE WRONG PHARMACY ON 05.08.2025. IT NEEDS TO BE SENT TO [facility] [address] [place] [address] [phone] [facility] [phone] FX

## 2025-05-13 NOTE — TELEPHONE ENCOUNTER
Rufina I ordered the medication and sent to [facility] pharmacy on [address] [place]

## 2025-06-03 ENCOUNTER — OFFICE VISIT (OUTPATIENT)
Dept: FAMILY MEDICINE CLINIC | Facility: CLINIC | Age: 65
End: 2025-06-03
Payer: COMMERCIAL

## 2025-06-03 VITALS
DIASTOLIC BLOOD PRESSURE: 82 MMHG | BODY MASS INDEX: 32.75 KG/M2 | TEMPERATURE: 96.8 F | WEIGHT: 203.8 LBS | OXYGEN SATURATION: 97 % | HEIGHT: 66 IN | HEART RATE: 80 BPM | SYSTOLIC BLOOD PRESSURE: 126 MMHG

## 2025-06-03 DIAGNOSIS — E78.2 MIXED HYPERLIPIDEMIA: Primary | Chronic | ICD-10-CM

## 2025-06-03 DIAGNOSIS — K21.9 GASTROESOPHAGEAL REFLUX DISEASE WITHOUT ESOPHAGITIS: Chronic | ICD-10-CM

## 2025-06-03 DIAGNOSIS — E78.01 FAMILIAL HYPERCHOLESTEROLEMIA: Chronic | ICD-10-CM

## 2025-06-03 DIAGNOSIS — F41.9 ANXIETY: Chronic | ICD-10-CM

## 2025-06-03 DIAGNOSIS — E55.9 VITAMIN D DEFICIENCY: Chronic | ICD-10-CM

## 2025-06-03 DIAGNOSIS — E66.811 CLASS 1 OBESITY DUE TO EXCESS CALORIES WITHOUT SERIOUS COMORBIDITY WITH BODY MASS INDEX (BMI) OF 32.0 TO 32.9 IN ADULT: Chronic | ICD-10-CM

## 2025-06-03 DIAGNOSIS — E66.09 CLASS 1 OBESITY DUE TO EXCESS CALORIES WITHOUT SERIOUS COMORBIDITY WITH BODY MASS INDEX (BMI) OF 32.0 TO 32.9 IN ADULT: Chronic | ICD-10-CM

## 2025-06-03 DIAGNOSIS — Z79.899 MEDICATION MANAGEMENT: ICD-10-CM

## 2025-06-03 LAB
AMPHET+METHAMPHET UR QL: NEGATIVE
AMPHETAMINE INTERNAL CONTROL: ABNORMAL
AMPHETAMINES UR QL: NEGATIVE
BARBITURATE INTERNAL CONTROL: ABNORMAL
BARBITURATES UR QL SCN: NEGATIVE
BENZODIAZ UR QL SCN: POSITIVE
BENZODIAZEPINE INTERNAL CONTROL: ABNORMAL
BUPRENORPHINE INTERNAL CONTROL: ABNORMAL
BUPRENORPHINE SERPL-MCNC: NEGATIVE NG/ML
CANNABINOIDS SERPL QL: NEGATIVE
COCAINE INTERNAL CONTROL: ABNORMAL
COCAINE UR QL: NEGATIVE
EDDP INTERNAL CONTROL: ABNORMAL
EDDP UR QL SCN: NEGATIVE
MDMA (ECSTASY) INTERNAL CONTROL: ABNORMAL
MDMA UR QL SCN: NEGATIVE
METHADONE INTERNAL CONTROL: ABNORMAL
METHADONE UR QL SCN: NEGATIVE
METHAMPHETAMINE INTERNAL CONTROL: ABNORMAL
MORPHINE INTERNAL CONTROL: ABNORMAL
MORPHINE UR QL SCN: NEGATIVE
OXYCODONE INTERNAL CONTROL: ABNORMAL
OXYCODONE UR QL SCN: NEGATIVE
PCP UR QL SCN: NEGATIVE
PHENCYCLIDINE INTERNAL CONTROL: ABNORMAL
PROPOXYPH UR QL SCN: NEGATIVE
PROPOXYPHENE INTERNAL CONTROL: ABNORMAL
THC INTERNAL CONTROL: ABNORMAL
TRICYCLIC ANTIDEPRESSANTS INTERNAL CONTROL: ABNORMAL
TRICYCLICS UR QL SCN: NEGATIVE

## 2025-06-03 RX ORDER — DIAZEPAM 10 MG/1
10 TABLET ORAL NIGHTLY PRN
Qty: 30 TABLET | Refills: 2 | Status: SHIPPED | OUTPATIENT
Start: 2025-06-03

## 2025-06-03 RX ORDER — M-VIT,TX,IRON,MINS/CALC/FOLIC 27MG-0.4MG
1 TABLET ORAL DAILY
COMMUNITY

## 2025-06-03 RX ORDER — PANTOPRAZOLE SODIUM 40 MG/1
40 TABLET, DELAYED RELEASE ORAL DAILY
Qty: 90 TABLET | Refills: 0 | Status: SHIPPED | OUTPATIENT
Start: 2025-06-03 | End: 2025-09-01

## 2025-06-03 RX ORDER — DIAZEPAM 5 MG/1
5 TABLET ORAL NIGHTLY
Qty: 30 TABLET | Refills: 2 | Status: SHIPPED | OUTPATIENT
Start: 2025-06-03 | End: 2025-06-03

## 2025-06-03 RX ORDER — ERGOCALCIFEROL 1.25 MG/1
50000 CAPSULE, LIQUID FILLED ORAL WEEKLY
Qty: 12 CAPSULE | Refills: 1 | Status: SHIPPED | OUTPATIENT
Start: 2025-06-03

## 2025-06-03 RX ORDER — ERGOCALCIFEROL 1.25 MG/1
1 CAPSULE, LIQUID FILLED ORAL WEEKLY
COMMUNITY
Start: 2025-04-12 | End: 2025-06-03 | Stop reason: SDUPTHER

## 2025-06-03 RX ORDER — EVOLOCUMAB 140 MG/ML
140 INJECTION, SOLUTION SUBCUTANEOUS
Qty: 6 ML | Refills: 1 | Status: SHIPPED | OUTPATIENT
Start: 2025-06-03

## 2025-06-03 RX ORDER — EVOLOCUMAB 140 MG/ML
140 INJECTION, SOLUTION SUBCUTANEOUS
COMMUNITY
Start: 2025-03-18 | End: 2025-06-03 | Stop reason: SDUPTHER

## 2025-06-03 RX ORDER — CITALOPRAM HYDROBROMIDE 10 MG/1
10 TABLET ORAL DAILY
Qty: 90 TABLET | Refills: 1 | Status: SHIPPED | OUTPATIENT
Start: 2025-06-03

## 2025-06-03 RX ORDER — RIZATRIPTAN BENZOATE 10 MG/1
10 TABLET ORAL ONCE AS NEEDED
Qty: 16 TABLET | Refills: 1 | Status: SHIPPED | OUTPATIENT
Start: 2025-06-03 | End: 2025-06-19

## 2025-06-03 NOTE — PROGRESS NOTES
"Chief Complaint  Med Refill    Subjective        Yarely Cevallos presents to [facility]  History and Present Illness    This is a 64-year-old female who was an established patient at [facility] prior to the closure of the clinic.  The patient has been enjoying overall good health she is concerned about her weight.  Patient has familial hypercholesteremia has had an extremely high LDL.  Has also had a mixture of other lipid abnormalities.  The patient is now on Repatha which is working very well.  Could not tolerate statins and they were not managing her LDL.  Patient denies any side effects to the medication and reports that she has no chest pain or shortness of breath.    She has gastroesophageal reflux and is on Protonix 40 mg daily which seems to manage her symptoms quite well.  Patient is careful with what she eats and feels that the medication is appropriate and has no side effects.    She struggles with chronic anxiety and is on citalopram she also takes diazepam nightly for anxiety as well as insomnia and it works well.  The patient has been compliant with the medication regimen her prescription monitoring report has been appropriate, she has not asked for early refills.  Urine drug screen was appropriate and she has signed a controlled substance agreement.    Patient also needs a refill on her vitamin D which has been helping with her fatigue    Also has obesity with a body mass index of 32.91.  We discussed the importance of diet and exercise patient verbalizes understanding.  Objective   Vital Signs:  /82 (BP Location: Left arm, Patient Position: Sitting, Cuff Size: Large Adult)   Pulse 80   Temp 96.8 °F (36 °C) (Infrared)   Ht 167.6 cm (65.98\")   Wt 92.4 kg (203 lb 12.8 oz)   SpO2 97%   BMI 32.91 kg/m²   Estimated body mass index is 32.91 kg/m² as calculated from the following:    Height as of this encounter: 167.6 cm (65.98\").    Weight as " of this encounter: 92.4 kg (203 lb 12.8 oz).            Physical Exam  Vitals reviewed.   Constitutional:       Appearance: Normal appearance.   HENT:      Head: Normocephalic.      Nose: Nose normal.   Cardiovascular:      Rate and Rhythm: Normal rate and regular rhythm.      Heart sounds: Normal heart sounds.   Pulmonary:      Effort: Pulmonary effort is normal.      Breath sounds: Normal breath sounds.   Abdominal:      General: Bowel sounds are normal.      Palpations: Abdomen is soft.   Musculoskeletal:         General: Normal range of motion.      Cervical back: Normal range of motion and neck supple.   Skin:     General: Skin is warm and dry.      Capillary Refill: Capillary refill takes less than 2 seconds.   Neurological:      Mental Status: She is alert and oriented to person, place, and time.   Psychiatric:         Mood and Affect: Mood normal.         Behavior: Behavior normal.         Thought Content: Thought content normal.         Judgment: Judgment normal.        Result Review :                Assessment and Plan   Diagnoses and all orders for this visit:    1. Mixed hyperlipidemia (Primary)  -     Repatha SureClick solution auto-injector SureClick injection; Inject 1 mL under the skin into the appropriate area as directed Every 14 (Fourteen) Days.  Dispense: 6 mL; Refill: 1    2. Familial hypercholesterolemia  -     rizatriptan (MAXALT) 10 MG tablet; Take 1 tablet by mouth 1 (One) Time As Needed for Migraine for up to 16 days. May repeat in 2 hours if needed  Dispense: 16 tablet; Refill: 1    3. Gastroesophageal reflux disease without esophagitis  -     pantoprazole (PROTONIX) 40 MG EC tablet; Take 1 tablet by mouth Daily for 90 days.  Dispense: 90 tablet; Refill: 0    4. Anxiety  -     Discontinue: diazePAM (VALIUM) 5 MG tablet; Take 1 tablet by mouth Every Night.  Dispense: 30 tablet; Refill: 2  -     citalopram (CeleXA) 10 MG tablet; Take 1 tablet by mouth Daily.  Dispense: 90 tablet; Refill:  1  -     diazePAM (VALIUM) 10 MG tablet; Take 1 tablet by mouth At Night As Needed for Anxiety.  Dispense: 30 tablet; Refill: 2    5. Vitamin D deficiency  -     vitamin D (ERGOCALCIFEROL) 1.25 MG (37636 UT) capsule capsule; Take 1 capsule by mouth 1 (One) Time Per Week.  Dispense: 12 capsule; Refill: 1    6. Medication management  -     POC Urine Drug Screen ([facility] ONLY)    7. Class 1 obesity due to excess calories without serious comorbidity with body mass index (BMI) of 32.0 to 32.9 in adult             Follow Up   Return in about 3 months (around 9/3/2025).  Patient was given instructions and counseling regarding her condition or for health maintenance advice. Please see specific information pulled into the AVS if appropriate.

## 2025-06-04 ENCOUNTER — PATIENT ROUNDING (BHMG ONLY) (OUTPATIENT)
Dept: FAMILY MEDICINE CLINIC | Facility: CLINIC | Age: 65
End: 2025-06-04
Payer: COMMERCIAL

## 2025-06-04 ENCOUNTER — TELEPHONE (OUTPATIENT)
Dept: FAMILY MEDICINE CLINIC | Facility: CLINIC | Age: 65
End: 2025-06-04
Payer: COMMERCIAL

## 2025-06-04 NOTE — PROGRESS NOTES
June 4, 2025    Hello, may I speak with Yarely Cevallos?    My name is SLADE     I am  with [facility]  [address] [facility] [address] [phone].    Before we get started may I verify your date of birth? 1960    I am calling to officially welcome you to our practice and ask about your recent visit. Is this a good time to talk? yes    Tell me about your visit with us. What things went well?  EVERYTHING WAS GOOD. FRIENDLY STAFF.        We're always looking for ways to make our patients' experiences even better. Do you have recommendations on ways we may improve?  no, EVERYONE WAS REALLY RECEPTIVE AND ANSWER QUESTIONS. WE ARE HAPPY     Overall were you satisfied with your first visit to our practice? yes       I appreciate you taking the time to speak with me today. Is there anything else I can do for you? no      Thank you, and have a great day.

## 2025-06-04 NOTE — TELEPHONE ENCOUNTER
Pt called stating Crow had called in for her Celexa but is unaware she was suppose to be taking this, pt is requesting a call back to discuss this.

## 2025-06-09 ENCOUNTER — RESULTS FOLLOW-UP (OUTPATIENT)
Dept: FAMILY MEDICINE CLINIC | Facility: CLINIC | Age: 65
End: 2025-06-09
Payer: COMMERCIAL

## 2025-07-14 ENCOUNTER — TELEPHONE (OUTPATIENT)
Age: 65
End: 2025-07-14
Payer: COMMERCIAL

## 2025-07-14 NOTE — TELEPHONE ENCOUNTER
Caller: Yarely Cevallos    Relationship: Self    Best call back number: [phone]     What is the best time to reach you: ANYTIME    Who are you requesting to speak with (clinical staff, provider,  specific staff member): CLINICAL    What was the call regarding: pantoprazole (PROTONIX) 40 MG EC tablet IS NEEDING A PRIOR AUTHORIZATION    Is it okay if the provider responds through MyChart: NO

## 2025-07-16 ENCOUNTER — TELEPHONE (OUTPATIENT)
Age: 65
End: 2025-07-16
Payer: COMMERCIAL

## 2025-07-16 DIAGNOSIS — K21.9 GASTROESOPHAGEAL REFLUX DISEASE WITHOUT ESOPHAGITIS: Chronic | ICD-10-CM

## 2025-07-16 DIAGNOSIS — F41.9 ANXIETY: Chronic | ICD-10-CM

## 2025-07-16 DIAGNOSIS — E78.01 FAMILIAL HYPERCHOLESTEROLEMIA: Chronic | ICD-10-CM

## 2025-07-16 RX ORDER — DIAZEPAM 10 MG/1
10 TABLET ORAL NIGHTLY PRN
Qty: 30 TABLET | Refills: 2 | Status: SHIPPED | OUTPATIENT
Start: 2025-07-16

## 2025-07-16 RX ORDER — RIZATRIPTAN BENZOATE 10 MG/1
10 TABLET ORAL ONCE AS NEEDED
Qty: 16 TABLET | Refills: 1 | Status: SHIPPED | OUTPATIENT
Start: 2025-07-16 | End: 2025-08-01

## 2025-07-16 RX ORDER — PANTOPRAZOLE SODIUM 40 MG/1
40 TABLET, DELAYED RELEASE ORAL DAILY
Qty: 90 TABLET | Refills: 2 | Status: SHIPPED | OUTPATIENT
Start: 2025-07-16 | End: 2026-04-12

## 2025-07-16 NOTE — TELEPHONE ENCOUNTER
Pt R/S her appt for End of August (29th) and pt will need refills before her appt.     Pt uses [facility] in [place]    pantoprazole (PROTONIX) 40 MG EC tablet [35307] (Order [id number])     diazePAM (VALIUM) 5 MG tablet [5007] (Order [id number])     rizatriptan (MAXALT) 10 MG tablet [80344] (Order [id number])

## 2025-07-16 NOTE — TELEPHONE ENCOUNTER
Caller: Yarely Cevallos    Relationship to patient: Self    Best call back number: [phone]     Chief complaint: 3 mo f.u     Type of visit: OFFICE VISIT     Requested date: ANY DAY THE LAST WEEK OF AUGUST, 2025 - REQUESTING TWO APPOINTMENTS AROUND 1 PM WITH PCP     If rescheduling, when is the original appointment: 09.10.25     Additional notes:    PATIENT IS SCHEDULED TO SEE SAMANTHA MILLER WITH ANOTHER PATIENT ON 09.10.25. HUB UNABLE TO ACCOMMODATE SCHEDULING REQUEST FOR TWO BACK TO BACK APPOINTMENTS WITH PROVIDER. PLEASE CALL PATIENT TO RESCHEDULE THESE APPOINTMENTS.

## 2025-08-26 ENCOUNTER — OFFICE VISIT (OUTPATIENT)
Age: 65
End: 2025-08-26
Payer: COMMERCIAL

## 2025-08-26 VITALS
SYSTOLIC BLOOD PRESSURE: 122 MMHG | BODY MASS INDEX: 30.66 KG/M2 | OXYGEN SATURATION: 97 % | HEIGHT: 65 IN | WEIGHT: 184 LBS | TEMPERATURE: 98.5 F | DIASTOLIC BLOOD PRESSURE: 84 MMHG | HEART RATE: 81 BPM

## 2025-08-26 DIAGNOSIS — E55.9 VITAMIN D DEFICIENCY: Chronic | ICD-10-CM

## 2025-08-26 DIAGNOSIS — L29.9 ITCHING: ICD-10-CM

## 2025-08-26 DIAGNOSIS — R21 RASH: ICD-10-CM

## 2025-08-26 DIAGNOSIS — E78.2 MIXED HYPERLIPIDEMIA: Chronic | ICD-10-CM

## 2025-08-26 DIAGNOSIS — E78.01 FAMILIAL HYPERCHOLESTEROLEMIA: Chronic | ICD-10-CM

## 2025-08-26 DIAGNOSIS — H61.22 IMPACTED CERUMEN OF LEFT EAR: Primary | ICD-10-CM

## 2025-08-26 RX ORDER — CLOTRIMAZOLE AND BETAMETHASONE DIPROPIONATE 10; .64 MG/G; MG/G
1 CREAM TOPICAL 2 TIMES DAILY
Qty: 180 G | Refills: 1 | Status: SHIPPED | OUTPATIENT
Start: 2025-08-26

## 2025-08-26 RX ORDER — ERGOCALCIFEROL 1.25 MG/1
50000 CAPSULE, LIQUID FILLED ORAL WEEKLY
Qty: 12 CAPSULE | Refills: 1 | Status: SHIPPED | OUTPATIENT
Start: 2025-08-26

## 2025-08-26 RX ORDER — CLOTRIMAZOLE AND BETAMETHASONE DIPROPIONATE 10; .64 MG/G; MG/G
1 CREAM TOPICAL 2 TIMES DAILY
COMMUNITY
End: 2025-08-26 | Stop reason: SDUPTHER

## 2025-08-26 RX ORDER — RIZATRIPTAN BENZOATE 10 MG/1
10 TABLET ORAL ONCE AS NEEDED
Qty: 18 TABLET | Refills: 1 | Status: SHIPPED | OUTPATIENT
Start: 2025-08-26 | End: 2025-09-11

## 2025-08-26 RX ORDER — EVOLOCUMAB 140 MG/ML
140 INJECTION, SOLUTION SUBCUTANEOUS
Qty: 6 ML | Refills: 1 | Status: SHIPPED | OUTPATIENT
Start: 2025-08-26

## 2025-08-26 RX ORDER — MOMETASONE FUROATE 1 MG/G
1 CREAM TOPICAL DAILY
Qty: 90 G | Refills: 1 | Status: SHIPPED | OUTPATIENT
Start: 2025-08-26

## 2025-08-26 RX ORDER — MOMETASONE FUROATE 1 MG/G
1 CREAM TOPICAL DAILY
COMMUNITY
End: 2025-08-26 | Stop reason: SDUPTHER

## (undated) DEVICE — DRSNG SURESITE HNDL 4X5

## (undated) DEVICE — PK EXTRM 30

## (undated) DEVICE — SUT MNCRYL 4/0 PS2 27IN UD MCP426H

## (undated) DEVICE — DISPOSABLE TOURNIQUET CUFF SINGLE BLADDER, SINGLE PORT AND QUICK CONNECT CONNECTOR: Brand: COLOR CUFF

## (undated) DEVICE — GLV SURG SENSICARE W/ALOE PF LF SZ6 STRL

## (undated) DEVICE — GLV SURG SENSICARE W/ALOE PF LF 7.5 STRL

## (undated) DEVICE — ST TBG AIRSEAL FLTR TRI LUM

## (undated) DEVICE — APPL DURAPREP IODOPHOR APL 26ML

## (undated) DEVICE — SUT ETHLN 3/0 FS1 30IN 669H

## (undated) DEVICE — ANTIBACTERIAL UNDYED BRAIDED (POLYGLACTIN 910), SYNTHETIC ABSORBABLE SUTURE: Brand: COATED VICRYL

## (undated) DEVICE — BANDAGE,GAUZE,BULKEE II,4.5"X4.1YD,STRL: Brand: MEDLINE

## (undated) DEVICE — BAPTIST TURNOVER KIT: Brand: MEDLINE INDUSTRIES, INC.

## (undated) DEVICE — ENDOPATH XCEL WITH OPTIVIEW TECHNOLOGY BLADELESS TROCARS WITH STABILITY SLEEVES: Brand: ENDOPATH XCEL OPTIVIEW

## (undated) DEVICE — ECTRA II PROCEDURE KIT,                                    SINGLE-USE, DISPOSABLE. CONTENTS                                    PROBE KNIFE, RETROGRADE KNIFE,                                    TRIANGLE KNIFE, HAND PAD, SWABS: Brand: ECTRA

## (undated) DEVICE — GLV SURG SENSICARE PI ORTHO SZ8 LF STRL

## (undated) DEVICE — BLADELESS OBTURATOR: Brand: WECK VISTA

## (undated) DEVICE — 4-PORT MANIFOLD: Brand: NEPTUNE 2

## (undated) DEVICE — CANNULA SEAL

## (undated) DEVICE — ARM DRAPE

## (undated) DEVICE — UNDERCAST PADDING: Brand: DEROYAL

## (undated) DEVICE — ELECTRD BLD EZ CLN MOD XLNG 2.75IN

## (undated) DEVICE — SPNG GZ WOVN 4X4IN 12PLY 10/BX STRL

## (undated) DEVICE — DRSNG GZ CURAD XEROFORM NONADHS 5X9IN STRL

## (undated) DEVICE — TROC ANCHORPORT BLADELES LP 8X120MM

## (undated) DEVICE — GLV SURG SENSICARE W/ALOE PF LF 6.5 STRL

## (undated) DEVICE — BNDG ELAS W/CLIP 6IN 10YD LF STRL

## (undated) DEVICE — ADHS SKIN PREMIERPRO EXOFIN TOPICAL HI/VISC .5ML

## (undated) DEVICE — TOTAL TRAY, 16FR 10ML SIL FOLEY, URN: Brand: MEDLINE

## (undated) DEVICE — KT CLN CLEANOR SCPE

## (undated) DEVICE — APPL HEMO ENDO SURGICEL 2IN1 1P/U STRL

## (undated) DEVICE — DAVINCI: Brand: MEDLINE INDUSTRIES, INC.

## (undated) DEVICE — PK POSTN TRENGUARD450 PROC

## (undated) DEVICE — SKIN PREP TRAY W/CHG: Brand: MEDLINE INDUSTRIES, INC.

## (undated) DEVICE — SUT VIC 0 UR6 27IN VCP603H

## (undated) DEVICE — TIP COVER ACCESSORY

## (undated) DEVICE — DRSNG SURESITE WNDW 4X4.5

## (undated) DEVICE — HEWSON SUTURE RETRIEVER: Brand: HEWSON SUTURE RETRIEVER

## (undated) DEVICE — 2, DISPOSABLE SUCTION/IRRIGATOR WITHOUT DISPOSABLE TIP: Brand: STRYKEFLOW

## (undated) DEVICE — PK TURNOVER RM ADV

## (undated) DEVICE — CLTH CLENS READYCLEANSE PERI CARE PK/5

## (undated) DEVICE — SYR LL TP 10ML STRL

## (undated) DEVICE — NDL HYPO PRECISIONGLIDE REG 22G 1 1/2